# Patient Record
Sex: FEMALE | Race: WHITE | Employment: FULL TIME | URBAN - METROPOLITAN AREA
[De-identification: names, ages, dates, MRNs, and addresses within clinical notes are randomized per-mention and may not be internally consistent; named-entity substitution may affect disease eponyms.]

---

## 2017-01-25 ENCOUNTER — HOSPITAL ENCOUNTER (INPATIENT)
Age: 42
LOS: 8 days | Discharge: HOME | End: 2017-02-04
Attending: OBSTETRICS & GYNECOLOGY | Admitting: OBSTETRICS & GYNECOLOGY
Payer: COMMERCIAL

## 2017-01-25 DIAGNOSIS — Z98.891 H/O CESAREAN SECTION: Primary | ICD-10-CM

## 2017-01-25 DIAGNOSIS — O14.93 PRE-ECLAMPSIA IN THIRD TRIMESTER: ICD-10-CM

## 2017-01-25 LAB
ALBUMIN SERPL BCP-MCNC: 2.3 G/DL (ref 3.5–5)
ALBUMIN/GLOB SERPL: 0.6 {RATIO} (ref 1.2–3.5)
ALP SERPL-CCNC: 146 U/L (ref 50–136)
ALT SERPL-CCNC: 11 U/L (ref 12–65)
ANION GAP BLD CALC-SCNC: 11 MMOL/L (ref 7–16)
AST SERPL W P-5'-P-CCNC: 13 U/L (ref 15–37)
BILIRUB SERPL-MCNC: 0.2 MG/DL (ref 0.2–1.1)
BUN SERPL-MCNC: 6 MG/DL (ref 6–23)
CALCIUM SERPL-MCNC: 8.5 MG/DL (ref 8.3–10.4)
CHLORIDE SERPL-SCNC: 106 MMOL/L (ref 98–107)
CO2 SERPL-SCNC: 23 MMOL/L (ref 21–32)
CREAT SERPL-MCNC: 0.52 MG/DL (ref 0.6–1)
ERYTHROCYTE [DISTWIDTH] IN BLOOD BY AUTOMATED COUNT: 14.5 % (ref 11.9–14.6)
GLOBULIN SER CALC-MCNC: 3.8 G/DL (ref 2.3–3.5)
GLUCOSE SERPL-MCNC: 91 MG/DL (ref 65–100)
HCT VFR BLD AUTO: 34.1 % (ref 35.8–46.3)
HGB BLD-MCNC: 11.1 G/DL (ref 11.7–15.4)
LDH SERPL L TO P-CCNC: 180 U/L (ref 100–190)
MCH RBC QN AUTO: 27.4 PG (ref 26.1–32.9)
MCHC RBC AUTO-ENTMCNC: 32.6 G/DL (ref 31.4–35)
MCV RBC AUTO: 84.2 FL (ref 79.6–97.8)
PLATELET # BLD AUTO: 246 K/UL (ref 150–450)
PMV BLD AUTO: 10 FL (ref 10.8–14.1)
POTASSIUM SERPL-SCNC: 3.9 MMOL/L (ref 3.5–5.1)
PROT SERPL-MCNC: 6.1 G/DL (ref 6.3–8.2)
RBC # BLD AUTO: 4.05 M/UL (ref 4.05–5.25)
SODIUM SERPL-SCNC: 140 MMOL/L (ref 136–145)
URATE SERPL-MCNC: 3.6 MG/DL (ref 2.6–6)
WBC # BLD AUTO: 12 K/UL (ref 4.3–11.1)

## 2017-01-25 PROCEDURE — 59025 FETAL NON-STRESS TEST: CPT

## 2017-01-25 PROCEDURE — 84156 ASSAY OF PROTEIN URINE: CPT | Performed by: OBSTETRICS & GYNECOLOGY

## 2017-01-25 PROCEDURE — 85027 COMPLETE CBC AUTOMATED: CPT | Performed by: OBSTETRICS & GYNECOLOGY

## 2017-01-25 PROCEDURE — 84550 ASSAY OF BLOOD/URIC ACID: CPT | Performed by: OBSTETRICS & GYNECOLOGY

## 2017-01-25 PROCEDURE — 80053 COMPREHEN METABOLIC PANEL: CPT | Performed by: OBSTETRICS & GYNECOLOGY

## 2017-01-25 PROCEDURE — 36415 COLL VENOUS BLD VENIPUNCTURE: CPT | Performed by: OBSTETRICS & GYNECOLOGY

## 2017-01-25 PROCEDURE — 99218 HC RM OBSERVATION: CPT

## 2017-01-25 PROCEDURE — 83615 LACTATE (LD) (LDH) ENZYME: CPT | Performed by: OBSTETRICS & GYNECOLOGY

## 2017-01-25 RX ORDER — OMEPRAZOLE 20 MG/1
20 CAPSULE, DELAYED RELEASE ORAL DAILY
COMMUNITY
End: 2017-02-16 | Stop reason: ALTCHOICE

## 2017-01-25 NOTE — PROGRESS NOTES
01/25/17 1820   Fetal Vital Signs   Mode External   Fetal Heart Rate 120   Fetal Activity Present   Variability 6-25 BPM   Decelerations None   Accelerations Yes   RN Reviewed Strip?  Yes   Non Stress Test Reactive   Uterine Activity   Mode External   Frequency (min) 0

## 2017-01-25 NOTE — IP AVS SNAPSHOT
[address] 
[phone] Patient: Guerrero Sidhu MRN: [id number] :1975 You are allergic to the following Allergen Reactions Aspirin Other (comments) asthma Daypro (Oxaprozin) Rash Flaxseed Rash Ibuprofen Other (comments) asthma Penicillins Other (comments) Patient states MD told her not to take because she has a severe mold allergy Yeast, Dried Unknown (comments) Immunizations Administered for This Admission Name Date Influenza Vaccine (Quad) PF  Deferred () Tdap  Deferred () Recent Documentation Height Weight Breastfeeding? BMI OB Status Smoking Status 1.6 m 110.3 kg Unknown 43.09 kg/m2 Recent pregnancy Never Smoker Emergency Contacts Name Discharge Info Relation Home Work Mobile Zechariah Quick CAREGIVER [3] Mother [14] [phone] About your hospitalization You were admitted on:  2017 You last received care in the:  [address] [facility] You were discharged on:  2017 Unit phone number:  [phone] Why you were hospitalized Your primary diagnosis was:  Pre-Eclampsia In Third Trimester Your diagnoses also included:  [place] (Advanced Maternal Age) Multigravida 35+, H/O  Section,  Delivery Delivered Providers Seen During Your Hospitalizations Provider Role Specialty Primary office phone Aimee Jackson MD Attending Provider Obstetrics & Gynecology [phone] Mohan Putnam DO Attending Provider Obstetrics & Gynecology [phone] Your Primary Care Physician (PCP) Primary Care Physician Office Phone Office Fax Bola Weston [phone] Follow-up Information Follow up With Details Comments Contact Info Corie Negro DO Schedule an appointment as soon as possible for a visit in 2 weeks call office for an appointment [address] [facility] [address] [employer] [place] [address] [phone] Ligia Fernandez MD   [address] [facility] [employer] [place] [address] 
[phone] Current Discharge Medication List  
  
START taking these medications Dose & Instructions Dispensing Information Comments Morning Noon Evening Bedtime  
 oxyCODONE IR 10 mg Tab immediate release tablet Commonly known as:  Stana Creston Your next dose is: Today, Tomorrow Other:  _________ Dose:  10 mg Take 1 Tab by mouth every four (4) hours as needed. Max Daily Amount: 60 mg.  
 Quantity:  40 Tab Refills:  0 CONTINUE these medications which have NOT CHANGED Dose & Instructions Dispensing Information Comments Morning Noon Evening Bedtime  
 albuterol 90 mcg/actuation inhaler Commonly known as:  Sharlene Jessica Your next dose is: Today, Tomorrow Other:  _________ Dose:  2 Puff Take 2 Puffs by inhalation every six (6) hours as needed. Patient instructed to bring hospital DOS Refills:  0  
     
   
   
   
  
 FLONASE 50 mcg/actuation nasal spray Generic drug:  fluticasone Your next dose is: Today, Tomorrow Other:  _________ Dose:  2 Spray 2 Sprays by Both Nostrils route nightly. Refills:  0 PNV-DHA PO Your next dose is: Today, Tomorrow Other:  _________ Take  by mouth. Refills:  0 PriLOSEC 20 mg capsule Generic drug:  omeprazole Your next dose is: Today, Tomorrow Other:  _________ Dose:  20 mg Take 20 mg by mouth daily. Refills:  0 PRILOSEC PO Your next dose is: Today, Tomorrow Other:  _________ Take  by mouth. Refills:  0  
     
   
   
   
  
 ZANTAC 150 mg tablet Generic drug:  raNITIdine Your next dose is: Today, Tomorrow Other:  _________ Dose:  150 mg Take 150 mg by mouth two (2) times a day. Refills:  0 ZyrTEC 10 mg tablet Generic drug:  cetirizine Your next dose is: Today, Tomorrow Other:  _________ Dose:  10 mg Take 10 mg by mouth nightly. Refills:  0 Where to Get Your Medications Information on where to get these meds will be given to you by the nurse or doctor. ! Ask your nurse or doctor about these medications  
  oxyCODONE IR 10 mg Tab immediate release tablet Discharge Instructions Discharge instruction to follow: Activity: Pelvis rest for 6 weeks No heavy lifting over 15 lbs for 2 weeks No driving for 2 weeks No push/pull motion such as sweeping or vacuuming for 2 weeks No tub baths for 6 weeks  section keep incision clean and dry, may shower as normal with soap and water. Inspect incision every day for signs of infection listed below. Continue to use ruth-bottle with every void or bowel movement until comfortable stopping. Change sanitary pad after each urination or bowel movement. Call MD for the following: 
    Fever over 101 F; pain not relieved by medication; foul smelling vaginal discharge or increase in vaginal bleeding. Redness, swelling, or drainage from  incision. Take medication as prescribed. Follow up with MD as order.  Section: What to Expect at [facility] Your Recovery A  section, or , is surgery to deliver your baby through a cut, called an incision, that the doctor makes in your lower belly and uterus. You may have some pain in your lower belly and need pain medicine for 1 to 2 weeks. You can expect some vaginal bleeding for several weeks.  You will probably need about 6 weeks to fully recover. It is important to take it easy while the incision is healing. Avoid heavy lifting, strenuous activities, or exercises that strain the belly muscles while you are recovering. Ask a family member or friend for help with housework, cooking, and shopping. This care sheet gives you a general idea about how long it will take for you to recover. But each person recovers at a different pace. Follow the steps below to get better as quickly as possible. How can you care for yourself at home? Activity · Rest when you feel tired. Getting enough sleep will help you recover. · Try to walk each day. Start by walking a little more than you did the day before. Bit by bit, increase the amount you walk. Walking boosts blood flow and helps prevent pneumonia, constipation, and blood clots. · Avoid strenuous activities, such as bicycle riding, jogging, weightlifting, and aerobic exercise, for 6 weeks or until your doctor says it is okay. · Until your doctor says it is okay, do not lift anything heavier than your baby. · Do not do sit-ups or other exercises that strain the belly muscles for 6 weeks or until your doctor says it is okay. · Hold a pillow over your incision when you cough or take deep breaths. This will support your belly and decrease your pain. · You may shower as usual. Pat the incision dry when you are done. · You will have some vaginal bleeding. Wear sanitary pads. Do not douche or use tampons until your doctor says it is okay. · Ask your doctor when you can drive again. · You will probably need to take at least 6 weeks off work. It depends on the type of work you do and how you feel. · Ask your doctor when it is okay for you to have sex. Diet · You can eat your normal diet. If your stomach is upset, try bland, low-fat foods like plain rice, broiled chicken, toast, and yogurt. · Drink plenty of fluids (unless your doctor tells you not to). · You may notice that your bowel movements are not regular right after your surgery. This is common. Try to avoid constipation and straining with bowel movements. You may want to take a fiber supplement every day. If you have not had a bowel movement after a couple of days, ask your doctor about taking a mild laxative. · If you are breastfeeding, do not drink any alcohol. Medicines · Your doctor will tell you if and when you can restart your medicines. He or she will also give you instructions about taking any new medicines. · If you take blood thinners, such as warfarin (Coumadin), clopidogrel (Plavix), or aspirin, be sure to talk to your doctor. He or she will tell you if and when to start taking those medicines again. Make sure that you understand exactly what your doctor wants you to do. · Take pain medicines exactly as directed. ¨ If the doctor gave you a prescription medicine for pain, take it as prescribed. ¨ If you are not taking a prescription pain medicine, ask your doctor if you can take an over-the-counter medicine. · If you think your pain medicine is making you sick to your stomach: 
¨ Take your medicine after meals (unless your doctor has told you not to). ¨ Ask your doctor for a different pain medicine. · If your doctor prescribed antibiotics, take them as directed. Do not stop taking them just because you feel better. You need to take the full course of antibiotics. Incision care · If you have strips of tape on the incision, leave the tape on for a week or until it falls off. · Wash the area daily with warm, soapy water, and pat it dry. Don't use hydrogen peroxide or alcohol, which can slow healing. You may cover the area with a gauze bandage if it weeps or rubs against clothing. Change the bandage every day. · Keep the area clean and dry. Other instructions · If you breastfeed your baby, you may be more comfortable while you are healing if you place the baby so that he or she is not resting on your belly. Try tucking your baby under your arm, with his or her body along the side you will be feeding on. Support your baby's upper body with your arm. With that hand you can control your baby's head to bring his or her mouth to your breast. This is sometimes called the football hold. Follow-up care is a key part of your treatment and safety. Be sure to make and go to all appointments, and call your doctor if you are having problems. It's also a good idea to know your test results and keep a list of the medicines you take. When should you call for help? Call 911 anytime you think you may need emergency care. For example, call if: 
· You passed out (lost consciousness). · You have symptoms of a blood clot in your lung (called a pulmonary embolism). These may include: 
¨ Sudden chest pain. ¨ Trouble breathing. ¨ Coughing up blood. · You have thoughts of harming yourself, your baby, or another person. Call your doctor now or seek immediate medical care if: 
· You have severe vaginal bleeding. This means that you are soaking through a pad every hour for 2 or more hours. · You are dizzy or lightheaded, or you feel like you may faint. · You have new or more belly pain. · You have loose stitches, or your incision comes open. · You have symptoms of infection, such as: 
¨ Increased pain, swelling, warmth, or redness. ¨ Red streaks leading from the incision. ¨ Pus draining from the incision. ¨ A fever. · You have symptoms of a blood clot in your leg (called a deep vein thrombosis), such as: 
¨ Pain in your calf, back of the knee, thigh, or groin. ¨ Redness and swelling in your leg or groin. Watch closely for changes in your health, and be sure to contact your doctor if: 
· You feel sad, anxious, or hopeless for more than a few days. · You do not get better as expected. Where can you learn more? Go to http://yan-rita.info/. Enter M806 in the search box to learn more about \" Section: What to Expect at Home. \" Current as of: May 30, 2016 Content Version: 11.1 © 8547-6856 [employer], [employer]orporated. Care instructions adapted under license by [employer] (which disclaims liability or warranty for this information). If you have questions about a medical condition or this instruction, always ask your healthcare professional. [address] any warranty or liability for your use of this information. Discharge Orders None Taomee Announcement We are excited to announce that we are making your provider's discharge notes available to you in Taomee. You will see these notes when they are completed and signed by the physician that discharged you from your recent hospital stay. If you have any questions or concerns about any information you see in Taomee, please call the Health Information Department where you were seen or reach out to your Primary Care Provider for more information about your plan of care. Introducing \[facility] [place] [facility]\"" & HEALTH SERVICES! Cr Morton introduces Taomee patient portal. Now you can access parts of your medical record, email your doctor's office, and request medication refills online. 1. In your internet browser, go to [email or web address] 2. Click on the First Time User? Click Here link in the Sign In box. You will see the New Member Sign Up page. 3. Enter your Taomee Access Code exactly as it appears below. You will not need to use this code after youve completed the sign-up process. If you do not sign up before the expiration date, you must request a new code. · Taomee Access Code: [email or web address] Expires: 2017  9:38 AM 
 
4. Enter the last four digits of your Social Security Number (xxxx) and Date of Birth (mm/dd/yyyy) as indicated and click Submit.  You will be taken to the next sign-up page. 5. Create a SiteMinder ID. This will be your SiteMinder login ID and cannot be changed, so think of one that is secure and easy to remember. 6. Create a SiteMinder password. You can change your password at any time. 7. Enter your Password Reset Question and Answer. This can be used at a later time if you forget your password. 8. Enter your e-mail address. You will receive e-mail notification when new information is available in [address] 9. Click Sign Up. You can now view and download portions of your medical record. 10. Click the Download Summary menu link to download a portable copy of your medical information. If you have questions, please visit the Frequently Asked Questions section of the SiteMinder website. Remember, SiteMinder is NOT to be used for urgent needs. For medical emergencies, dial 911. Now available from your iPhone and Android! General Information Please provide this summary of care documentation to your next provider. Patient Signature:  ____________________________________________________________ Date:  ____________________________________________________________  
  
Tramaine Whitley Provider Signature:  ____________________________________________________________ Date:  ____________________________________________________________

## 2017-01-25 NOTE — PROGRESS NOTES
Pt arrived. Placed in 46 for direct admission from [facility] for preeclampsia work up. Orders received. Dr. Dominguez Quinones also aware of consult.

## 2017-01-25 NOTE — IP AVS SNAPSHOT
Current Discharge Medication List  
  
Take these medications at their scheduled times Dose & Instructions Dispensing Information Comments Morning Noon Evening Bedtime FLONASE 50 mcg/actuation nasal spray Generic drug:  fluticasone Your next dose is: Today, Tomorrow Other:  ____________ Dose:  2 Spray 2 Sprays by Both Nostrils route nightly. Refills:  0 PriLOSEC 20 mg capsule Generic drug:  omeprazole Your next dose is: Today, Tomorrow Other:  ____________ Dose:  20 mg Take 20 mg by mouth daily. Refills:  0  
     
   
   
   
  
 ZANTAC 150 mg tablet Generic drug:  raNITIdine Your next dose is: Today, Tomorrow Other:  ____________ Dose:  150 mg Take 150 mg by mouth two (2) times a day. Refills:  0 ZyrTEC 10 mg tablet Generic drug:  cetirizine Your next dose is: Today, Tomorrow Other:  ____________ Dose:  10 mg Take 10 mg by mouth nightly. Refills:  0 Take these medications as needed Dose & Instructions Dispensing Information Comments Morning Noon Evening Bedtime  
 albuterol 90 mcg/actuation inhaler Commonly known as:  Verlon Abler Your next dose is: Today, Tomorrow Other:  ____________ Dose:  2 Puff Take 2 Puffs by inhalation every six (6) hours as needed. Patient instructed to bring hospital DOS Refills:  0  
     
   
   
   
  
 oxyCODONE IR 10 mg Tab immediate release tablet Commonly known as:  Argenis Santo Your next dose is: Today, Tomorrow Other:  ____________ Dose:  10 mg Take 1 Tab by mouth every four (4) hours as needed. Max Daily Amount: 60 mg.  
 Quantity:  40 Tab Refills:  0 Take these medications as directed Dose & Instructions Dispensing Information Comments Morning Noon Evening Bedtime PNV-DHA PO Your next dose is: Today, Tomorrow Other:  ____________ Take  by mouth. Refills:  0 PRILOSEC PO Your next dose is: Today, Tomorrow Other:  ____________ Take  by mouth. Refills:  0 Where to Get Your Medications Information about where to get these medications is not yet available ! Ask your nurse or doctor about these medications  
  oxyCODONE IR 10 mg Tab immediate release tablet

## 2017-01-26 PROBLEM — O13.3 PREGNANCY-INDUCED HYPERTENSION IN THIRD TRIMESTER: Status: ACTIVE | Noted: 2017-01-26

## 2017-01-26 PROBLEM — I10 HYPERTENSION: Status: ACTIVE | Noted: 2017-01-26

## 2017-01-26 LAB
COLLECT DURATION TIME UR: 24 HR
PROT 24H UR-MRATE: 578 MG/24HR
PROT UR-MCNC: 11 MG/DL
SPECIMEN VOL ?TM UR: 5250 ML

## 2017-01-26 PROCEDURE — 99218 HC RM OBSERVATION: CPT

## 2017-01-26 PROCEDURE — 76805 OB US >/= 14 WKS SNGL FETUS: CPT | Performed by: OBSTETRICS & GYNECOLOGY

## 2017-01-26 PROCEDURE — 76819 FETAL BIOPHYS PROFIL W/O NST: CPT | Performed by: OBSTETRICS & GYNECOLOGY

## 2017-01-26 PROCEDURE — 59025 FETAL NON-STRESS TEST: CPT

## 2017-01-26 PROCEDURE — 76820 UMBILICAL ARTERY ECHO: CPT | Performed by: OBSTETRICS & GYNECOLOGY

## 2017-01-26 PROCEDURE — 74011250636 HC RX REV CODE- 250/636: Performed by: OBSTETRICS & GYNECOLOGY

## 2017-01-26 PROCEDURE — 74011250637 HC RX REV CODE- 250/637: Performed by: OBSTETRICS & GYNECOLOGY

## 2017-01-26 RX ORDER — BETAMETHASONE SODIUM PHOSPHATE AND BETAMETHASONE ACETATE 3; 3 MG/ML; MG/ML
12 INJECTION, SUSPENSION INTRA-ARTICULAR; INTRALESIONAL; INTRAMUSCULAR; SOFT TISSUE EVERY 24 HOURS
Status: DISCONTINUED | OUTPATIENT
Start: 2017-01-26 | End: 2017-01-28

## 2017-01-26 RX ORDER — ACETAMINOPHEN 500 MG
1000 TABLET ORAL
Status: DISCONTINUED | OUTPATIENT
Start: 2017-01-26 | End: 2017-02-01

## 2017-01-26 RX ADMIN — BETAMETHASONE SODIUM PHOSPHATE AND BETAMETHASONE ACETATE 12 MG: 3; 3 INJECTION, SUSPENSION INTRA-ARTICULAR; INTRALESIONAL; INTRAMUSCULAR at 21:56

## 2017-01-26 RX ADMIN — ACETAMINOPHEN 1000 MG: 500 TABLET, COATED ORAL at 20:29

## 2017-01-26 RX ADMIN — ACETAMINOPHEN 1000 MG: 500 TABLET, COATED ORAL at 14:12

## 2017-01-26 NOTE — PROGRESS NOTES
Pt irritated , reports she received a phone call from home health agency asking questions regarding her [facility] that they should be getting from her Doctors office. Pt states they are jumping the gun with thinking she is going home when we don't have the results to her lab work yet. Reassurance given.

## 2017-01-26 NOTE — CONSULTS
Maternal Fetal Medicine Consult Note      Requesting DELORES Gudino    Chief Complaint:  Pregnancy and elevated BP at [facility] office. History of Present Eb Patel is a 42 y.o.   with an estimated gestational age of 43w3d with Estimated Date of Delivery: 17. Pregnancy has been complicated by advanced maternal age. Patient had low risk NIPT and normal anatomy in primary ob office. FOllow up growth at ~34 weeks with appropriate overall EFW but AC at 97%. 1hr . TWG thus far 56#. Now with increased BP over baseline (166/94 in ob office) admitted for preeclampsia evaluation. New peripheral edema the past 2 weeks. Since admission, BP normal to mild range on bedrest. Headache has developed during course of day today. Patient denies HA, RUQ pain, vision changes, or other concerns. Fetus has been active without any recent decrease in movement activity.  No regular contractions, LOF, VB.     OB History    Para Term  AB SAB TAB Ectopic Multiple Living   2 1 1 0 0 0 0 0 0 1      # Outcome Date GA Lbr Ankit/2nd Weight Sex Delivery Anes PTL Lv   2 Current            1 Term 2006    F CS-LTranv   Y         Past Surgical History   Procedure Laterality Date    Hx heent       sinus    Hx  section         Past Medical History   Diagnosis Date    Asthma      uses inhaler as needed    Endometrial polyp     H/O seasonal allergies      seasonal allergies    Pregnancy 2016       Family History   Problem Relation Age of Onset    Heart Disease Mother     Heart Disease Father     Cancer Paternal Grandmother      colon    Malignant Hyperthermia Neg Hx     Pseudocholinesterase Deficiency Neg Hx     Delayed Awakening Neg Hx     Post-op Nausea/Vomiting Neg Hx     Emergence Delirium Neg Hx     Post-op Cognitive Dysfunction Neg Hx     Other Neg Hx     Breast Cancer Neg Hx        Allergies   Allergen Reactions    Aspirin Other (comments)     asthma  Daypro [Oxaprozin] Rash    Flaxseed Rash    Ibuprofen Other (comments)     asthma    Penicillins Other (comments)     Patient states MD told her not to take because she has a severe mold allergy    Yeast, Dried Unknown (comments)       No current facility-administered medications for this encounter. Social History     Social History    Marital status: SINGLE     Spouse name: N/A    Number of children: N/A    Years of education: N/A     Occupational History    Not on file. Social History Main Topics    Smoking status: Never Smoker    Smokeless tobacco: Not on file    Alcohol use No      Comment: occasional before prenancy    Drug use: No    Sexual activity: No     Other Topics Concern    Not on file     Social History Narrative       Review of Systems  A comprehensive review of systems was negative except for that written in the HPI. Vitals:    Patient Vitals for the past 24 hrs:   BP   17 0803 134/67   17 0241 137/88   17 2256 130/75   17 2102 154/80   17 1802 137/88     Temp (24hrs), Av.4 °F (36.9 °C), Min:98.3 °F (36.8 °C), Max:98.5 °F (36.9 °C)      I&O:                1901 -  0700  In: 1166 [P.O.:4010]  Out: 5517 [Urine:5250]    Exam:  Patient without distress.                Abdomen: soft, non-tender               Fundus: soft and non tender               Fundal Height: 40 cm               Right Upper Quadrant: non-tender               Lower Extremity Edema: 2+               Patellar Reflexes: 1+ bilaterally               Clonus: absent    Cervical Exam:                                    Uterine Activity: Frequency (min):  (occasional) Intensity: Mild                                 Membranes: Membrane Status: Intact                              Fetal Heart Rate: Mode: ExternalFetal Heart Rate: 135          Labs:   CBC:    Recent Labs      17   1824  17   0835 16   WBC  12.0*  10.4   --    HGB  11.1*  11.5   --    HCT 34.1*  35.8   --    PLT  246  242   --    HGBEXT   --    --   13.9   HCTEXT   --    --   42.5   PLTEXT   --    --   307       CMP:   Recent Labs      174  17   0835   NA  140  141   K  3.9  4.5   CL  106  105   CO2  23  20   AGAP  11   --    GLU  91  123*   BUN  6  6   CREA  0.52*  0.56*   GFRAA  >60  133   GFRNA  >60  115   CA  8.5  9.4   ALB  2.3*  3.1*   TP  6.1*  5.3*   GLOB  3.8*   --    AGRAT  0.6*  1.4   SGOT  13*  16   ALT  11*  8       Recent Labs      174  17   0837   URICA  3.6  5.0   LDH  180  180       Recent Glucose Results: Recent Glucose Results:   Recent Labs      174  17   0835   GLU  91  123*       Prenatal Labs:    Lab Results   Component Value Date/Time    Rubella, External 4.77 2016    HBsAg, External NEG 2016    HIV, External NR 2016    RPR, External NR 2016       Imaging: Full report to follow. Polyhydramnios (25cm) with Accelerated fetal growth. Normal umbilical artery Doppler studies      Assessment and Plan:    Patient Active Problem List    Diagnosis    Pregnancy-induced hypertension in third trimester    AMA (advanced maternal age) multigravida 35+     43 yo low risk NIPT  Growth scans      H/O  section     Plan repeat         Hypertensive Disorder of Pregnancy: gestational hypertension versus preeclampsia without severe features. Diagnosis  Diagnosis of preeclampsia depends on development of elevated BP (SBP>140, DBP >90) beyond 20 weeks gestation or worsening of preexisting blood pressure. Severe blood pressures, SBP>160 or DBP>110 or higher on two occasions 4 hours apart, on bedrest (unless antihypertensive therapy is initiated before this time) are a criteria of severe disease. Proteinuria (>300mg/24hr or protein:creatine ratio >0.3) may or may not be present. The presence or absence of proteinuria is not predictive of maternal or fetal outcome.    If proteinuria is absent, diagnosis requires at least one of the following features of severe disease:  ·  thrombocytopenia (platelet count <256S)  · impaired liver function (transaminaases increased greater than 2x normal)  · new renal insufficiency (Cr >1.1, or doubling in absence of other renal disease)  · pulmonary edema  · new onset of cerebral/visual disturbances. Anti-hypertensive Therapy  · No therapy is recommended for new onset HTN for blood pressures consistently in mild range (SBP<160, DBP<110). · Use of anti-hypertensive therapy is recommended if SBP >160, DBP >110. Antepartum monitoring  · Daily assessment of maternal symptoms and fetal movement  · At least twice weekly measures of BP  · Weekly assessment of platelet count and LFTs. · If gestational HTN, weekly NST/BPP; if preeclampsia without severe features, twice weekly NST/BPP. · Compliant patients without features of severe disease may qualify for outpatient management on a case by case basis. If would like home BP monitoring, please consult social work to aid in setting this up. GESTATIONAL HTN-   Patient with proteinuria <300 mgs will not meet criteria for preeclampsia. With reassuring fetal status and appropriate growth, recommendations for timing of delivery is waiting until 37-39 weeks. PREECLAMPSIA without SEVERE FEATURES- Attempt to manage expectantly until 37 0/7 weeks, unless features of severe preeclampsia occurs or fetal status becomes non reassuring  · In those with mild range BP (<160 SBP, <110 DBP) and without maternal symptoms, magnesium sulfate is not necessary. · Consider late   steroids if <37 weeks. PREECLAMPSIA with SEVERE FEATURES, including HELLP syndrome:  · If becomes severe, magnesium sulfate is recommended. 4-6 gram bolus, then maintenance of 2 grams per hour. · Get serum Mg level 4 hours after bolus, then every 6 hours.     Mode of delivery based on obstetric indications, rather than diagnosis of hypertensive disorder in pregnancy. POSTPARTUM CARE  · NSAID use should be avoided postpartum in women with hypertension persisting for more than 1 days after delivery. · BP monitoring as an inpatient for at least 72 hours postpartum, and again 7-10 days after delivery. · If persistent HTN postpartum (SBP>150, DBP>100), recommend initiation of antihypertensive therapy. · Fluid shifts should be closely monitored in the postpartum setting, with strict I&Os every shift and daily weights. If diuresis has not occurred within 48 hours of delivery, patient is at elevated risk of pulmonary edema. Recommendations based on ACOG Committee Opinion #560, April 2013 and \"Hypertension in [place]" developed by the [place] on HTN in Pregnancy for ACOG, November 2013. Probable glucose intolerance with elevated GCT, polyhydramnios, accelerated fetal growth. Recommend checking glucose x 24hr. Plan:   · Admit for observation  · Strict I&Os  · Daily weights  · Repeat preeclampsia labs (CBC, CMP, Uric Acid, LDH) if worsening  · Assess for proteinuria- timed urine complete this pm  · Daily monitoring with NSTs while inpatient  · Ultrasonographic evaluation of fetal growth, fluid, and well-being today - full report pending. · Deliver for worsening maternal or fetal status      Signed By:  Rigo Martines MD     January 26, 2017             Time:110    Minutes spent on floor,with greater than 50% of the time examining patient, explaining plan and coordinating care with nurse and requesting primary physician.

## 2017-01-26 NOTE — H&P
Ante Partum High Risk Pregnancy Note    Patient admitted for elevated blood pressure in physician's office  seen and examined by Dr Lisy Gudino yesterday in office was sent here for pre-ecl workup per [facility] recomendations states she does have edema. No headache not vision changes, good fetal movement    LOS:  1  Vitals: Temp (24hrs), Av.4 °F (36.9 °C), Min:98.3 °F (36.8 °C), Max:98.5 °F (36.9 °C)   Patient Vitals for the past 24 hrs:   BP   17 0803 134/67   17 0241 137/88   17 2256 130/75   17 2102 154/80   17 1802 137/88       I&O:                  190 -  0700  In: 3042 [P.O.:4010]  Out: 6441 [Urine:5250]    Exam:  Patient without distress.                Abdomen: soft, non-tender               Fundus: soft and non tender                          Right Upper Quadrant: non-tender               Perineum: No sign of blood or amniotic fluid               Lower Extremities: 2+               Patellar Reflexes: 1+ bilaterally               Clonus: absent                            NST:  reactive           Lab/Data Review:  CMP:   Lab Results   Component Value Date/Time     2017 06:24 PM    K 3.9 2017 06:24 PM     2017 06:24 PM    CO2 23 2017 06:24 PM    AGAP 11 2017 06:24 PM    GLU 91 2017 06:24 PM    BUN 6 2017 06:24 PM    CREA 0.52 (L) 2017 06:24 PM    GFRAA >60 2017 06:24 PM    GFRNA >60 2017 06:24 PM    CA 8.5 2017 06:24 PM    ALB 2.3 (L) 2017 06:24 PM    TP 6.1 (L) 2017 06:24 PM    GLOB 3.8 (H) 2017 06:24 PM    AGRAT 0.6 (L) 2017 06:24 PM    SGOT 13 (L) 2017 06:24 PM    ALT 11 (L) 2017 06:24 PM     CBC:   Lab Results   Component Value Date/Time    WBC 12.0 (H) 2017 06:24 PM    HGB 11.1 (L) 2017 06:24 PM    HCT 34.1 (L) 2017 06:24 PM     2017 06:24 PM       Assessment and Plan:      Principal Problem:    Hypertension (1/26/2017)          hypertension in 3rd trimester, some elevated BP's noted at 19 week visit 134/74. ? CHTN worsening vs preecp- check 24 hour urine.  MFM to do US today, all labs normal

## 2017-01-26 NOTE — PROGRESS NOTES
1000 - Referral faxed to [facility] (P: [phone]) to arrange home BP monitoring. 1230 - Phone call placed to [facility].  confirmed that [employer] does cover home BP monitoring. RN with [facility] has not yet contacted patient for intake, but will do so shortly. 1440 - Phone call received from [facility] representative who states that she just spoke with patient who became extremely upset over the phone. Patient informed [facility] that she was unaware of who they were and why they were calling and asking questions. [facility] then ended the conversation with patient and has asked that this  follow-up with patient. RN was notified of this information. At this time, [facility] will await feedback from hospital before contacting patient again to arrange services.     Chel De La Cruz, [address] [facility]

## 2017-01-26 NOTE — PROGRESS NOTES
Called to room pt c/o having a dull headache 2/10 on pain scale. Dr Gertrude Kendrick notified orders received.

## 2017-01-27 PROBLEM — O16.9 HTN COMPLICATING PERIPREGNANCY, ANTEPARTUM: Status: ACTIVE | Noted: 2017-01-27

## 2017-01-27 PROCEDURE — 65270000029 HC RM PRIVATE

## 2017-01-27 PROCEDURE — 74011250637 HC RX REV CODE- 250/637: Performed by: OBSTETRICS & GYNECOLOGY

## 2017-01-27 PROCEDURE — 74011250636 HC RX REV CODE- 250/636: Performed by: OBSTETRICS & GYNECOLOGY

## 2017-01-27 PROCEDURE — 99218 HC RM OBSERVATION: CPT

## 2017-01-27 PROCEDURE — 59025 FETAL NON-STRESS TEST: CPT

## 2017-01-27 PROCEDURE — 99231 SBSQ HOSP IP/OBS SF/LOW 25: CPT | Performed by: OBSTETRICS & GYNECOLOGY

## 2017-01-27 RX ADMIN — BETAMETHASONE SODIUM PHOSPHATE AND BETAMETHASONE ACETATE 12 MG: 3; 3 INJECTION, SUSPENSION INTRA-ARTICULAR; INTRALESIONAL; INTRAMUSCULAR at 22:00

## 2017-01-27 RX ADMIN — ACETAMINOPHEN 1000 MG: 500 TABLET, COATED ORAL at 19:49

## 2017-01-27 NOTE — PROGRESS NOTES
01/27/17 1612   Maternal Vital Signs   Temp 97.9 °F (36.6 °C)   Temp Source Oral   Pulse (Heart Rate) (!) 118   Resp Rate 20   Level of Consciousness Alert   /85   MAP (Calculated) 107   BP 1 Method Automatic   BP 1 Location Right arm   BP Patient Position Sitting

## 2017-01-27 NOTE — PROGRESS NOTES
Dr. Nj Sheppard called to nurses' station. MD reviewed 24hr urine results with Dr. Candida Goldstein. MD orders received for celestone series.

## 2017-01-27 NOTE — PROGRESS NOTES
Tylenol 1000mg given po for c/o headache that pt rates as 3/10  No reports of blurred vision or flashes of light. Pt encouraged to rest on side and call for unrelieved pain or changes.

## 2017-01-27 NOTE — PROGRESS NOTES
Ante Partum High Risk Pregnancy Note  Patient: Silvia Victor  MRN: [id number]    Patient admitted for elevated blood pressure in physician's office  states she does not  have  headache , abdominal pain  , contractions, right upper quadrant pain  , vaginal bleeding , swelling and vaginal leaking of fluid . LOS:  2  Vitals: Temp (24hrs), Av.3 °F (36.8 °C), Min:98.1 °F (36.7 °C), Max:98.4 °F (36.9 °C)   Patient Vitals for the past 24 hrs:   BP   17 1012 147/68   17 1010 173/90   17 0804 (!) 158/99   17 2203 136/90   17 1934 150/87   17 1932 150/87   17 1755 141/83   17 1507 135/57   17 1344 172/89       I&O:                  190 -  0700  In: 6675 [P.O.:4010]  Out: 0498 [Urine:5250]    Exam:  Patient without distress. Abdomen: soft, non-tender               Fundus: soft and non tender               Fundal Height: 37 cm               Right Upper Quadrant: non-tender               Perineum: No sign of blood or amniotic fluid               Lower Extremities: 2 + swelling               Patellar Reflexes: 1+ bilaterally               Clonus: absent               Fetal Monitoring:  No decels   Uterine Activity: None                            NST:  reactive           Labs: No results found for this or any previous visit (from the past 24 hour(s)). Assessment and Plan:      Principal Problem:    Pregnancy-induced hypertension in third trimester (2017)    Active Problems:    AMA (advanced maternal age) multigravida 35+ (2016)      Overview: 43 yo low risk NIPT      Growth scans      H/O  section (2016)      Overview: Plan repeat          Pregnancy-Induced Hypertension:  Continue present management  Antepartum testing  Bed rest  Consulted Dr. Yuko Aguirre to cont in hospital until planned repeat Csection at 37 weeks, deliver if condition deteriorates. Pt to receive second steroid dose today.

## 2017-01-27 NOTE — PROGRESS NOTES
01/27/17 1040   Fetal Vital Signs   Mode External   Fetal Heart Rate 140   Fetal Activity Present   Variability 6-25 BPM   Decelerations None   Accelerations Yes   RN Reviewed Strip?  Yes   Non Stress Test Reactive   Uterine Activity   Mode External   Frequency (min) 0

## 2017-01-27 NOTE — PROGRESS NOTES
Shift assessment complete, see flowsheet for complete assessment. Abdomen palpated soft and non-tender. +FM. Pt denies HA, visual disturbances, RUQ pain, nausea, vaginal bleeding, gush of fluid, or UCs. Pt instructed to notify RN if any of the above occur or any changes. Pt verbalizes understanding and agreement. Denies any questions, needs, or concerns at this time. Pt resting comfortably in bed. Ice pitcher refilled.

## 2017-01-27 NOTE — PROGRESS NOTES
MFM Antepartum Progress Note    Patient admitted for elevated BP. She states she has had intermittent headaches, none currently. No abdominal pain, vision changes. No CP/SOA. No regular contractions/LOF/VB. Good FM.      Vitals:  Patient Vitals for the past 24 hrs:   BP   17 1012 147/68   17 1010 173/90   17 0804 (!) 158/99   17 2203 136/90   17 1934 150/87   17 1932 150/87   17 1755 141/83   17 1507 135/57   17 1344 172/89     Temp (24hrs), Av.3 °F (36.8 °C), Min:98.1 °F (36.7 °C), Max:98.4 °F (36.9 °C)      I&O:                1901 -  0700  In: 4010 [P.O.:4010]  Out: 1431 [Urine:5250]                                Uterine Activity: Frequency (min): 0 Intensity: Mild                                 Membranes: Membrane Status: Intact                              Fetal Heart Rate: Mode: ExternalFetal Heart Rate: 140          Labs:   CBC:  Recent Labs      17   0835 16   WBC  12.0*  10.4   --    HGB  11.1*  11.5   --    HCT  34.1*  35.8   --    PLT  246  242   --    HGBEXT   --    --   13.9   HCTEXT   --    --   42.5   PLTEXT   --    --   307       CMP: Recent Labs      17   0835   NA  140  141   K  3.9  4.5   CL  106  105   CO2  23  20   AGAP  11   --    GLU  91  123*   BUN  6  6   CREA  0.52*  0.56*   GFRAA  >60  133   GFRNA  >60  115   CA  8.5  9.4   ALB  2.3*  3.1*   TP  6.1*  5.3*   GLOB  3.8*   --    AGRAT  0.6*  1.4   SGOT  13*  16   ALT  11*  8       Recent Labs      17   1745  17   0837   URICA  3.6   --   5.0   LDH  180   --   180   PUQ   --   578   --        Recent Glucose Results: Recent Glucose Results: Recent Labs      17   1824  17   0835   GLU  91  123*       Prenatal Labs:  Lab Results   Component Value Date/Time    Rubella, External 4.77 2016    HBsAg, External NEG 2016    HIV, External NR 2016    RPR, External NR 2016         Assessment and Plan:    Patient Active Problem List    Diagnosis    Pregnancy-induced hypertension in third trimester    AMA (advanced maternal age) multigravida 35+     43 yo low risk NIPT  Growth scans      H/O  section     Plan repeat         Hypertensive Disorder of Pregnancy: preeclampsia without severe features    Patient stable. -Preeclampsia without severe features- Attempt to manage expectantly until 37 0/7 weeks, unless severe preeclampsia occurs or fetal status becomes non reassuring    Timing of delivery recommendations are based on ACOG Committee Opinion #560, 2013 and \"Hypertension in [place]" task force report by ACOG, 2013. Complete steroids  Deliver for severe disease. Time:15    Minutes spent on floor,with greater than 50% of the time examining patient, explaining plan and coordinating care with nurse and requesting primary physician.

## 2017-01-27 NOTE — PROGRESS NOTES
01/27/17 1010 01/27/17 1012   Maternal Vital Signs   Pulse (Heart Rate) (!) 118 (!) 111   Resp Rate 20 20   Level of Consciousness Alert Alert   /90 147/68   MAP (Calculated) 118 94   BP 1 Method Automatic Automatic   BP 1 Location Right arm Right arm   BP Patient Position Sitting Lying left side

## 2017-01-27 NOTE — PROGRESS NOTES
Phone call to [employer] @ [phone] to inform that patient is no longer in need of home BP monitoring.     Ariel Reardon, [address] [facility]

## 2017-01-27 NOTE — PROGRESS NOTES
Assessment complete at this time per flow sheet. Vital signs stable. Patient denies pain, vaginal bleeding, leaking of fluid, epigastric pain, visual disturbance and contractions. Reports no headache at this time. See flowsheet for complete assessment. Patient states positive fetal movement. +DTR's, absent clonus. Encouraged to call as needed.

## 2017-01-28 PROCEDURE — 65270000029 HC RM PRIVATE

## 2017-01-28 PROCEDURE — 59025 FETAL NON-STRESS TEST: CPT

## 2017-01-28 NOTE — PROGRESS NOTES
Ante Partum High Risk Pregnancy Note  Patient: Catrachito Stallings  MRN: [id number]    Patient admitted for elevated blood pressure in physician's office  states she does not  have  headache , abdominal pain  , contractions, right upper quadrant pain  , vaginal bleeding , swelling and vaginal leaking of fluid . LOS:  3  Vitals: Temp (24hrs), Av.9 °F (36.6 °C), Min:97.6 °F (36.4 °C), Max:98.1 °F (36.7 °C)   Patient Vitals for the past 24 hrs:   BP   17 1225 139/81   17 0917 156/89   17 0901 153/80   17 0850 151/90   17 2201 128/67   17 1922 144/85   17 1613 151/85   17 1612 151/85       I&O:                      Exam:  Patient without distress. Abdomen: soft, non-tender               Fundus: soft and non tender               Fundal Height: 37 cm               Right Upper Quadrant: tender               Perineum: No sign of blood or amniotic fluid               Lower Extremities: No               Patellar Reflexes: 1+ bilaterally               Clonus: absent               Fetal Monitoring:  No decels   Uterine Activity: None                        NST:  reactive           Labs: No results found for this or any previous visit (from the past 24 hour(s)). Assessment and Plan:      Principal Problem:    Pregnancy-induced hypertension in third trimester (2017)    Active Problems:    AMA (advanced maternal age) multigravida 35+ (2016)      Overview: 43 yo low risk NIPT      Growth scans      H/O  section (2016)      Overview: Plan repeat      HTN complicating peripregnancy, antepartum (2017)          Preeclampsia:  moderate  Complete 48 hour course of steroids to promote fetal lung maturity. Daily Fetal monitoring with Non-stress tests  Deliver for Hemolytic Anemia-Elevated Liver Enzymes- Low Platelet Count syndrome, fetal nonreasurrance or worsening maternal condition.   Pt with Hx of C section, will remain in hospital until 40 weeks and have repeat then, deliver sooner if condition deteriorates.

## 2017-01-29 PROCEDURE — 74011250637 HC RX REV CODE- 250/637: Performed by: OBSTETRICS & GYNECOLOGY

## 2017-01-29 PROCEDURE — 65270000029 HC RM PRIVATE

## 2017-01-29 PROCEDURE — 59025 FETAL NON-STRESS TEST: CPT

## 2017-01-29 RX ORDER — FAMOTIDINE 20 MG/1
20 TABLET, FILM COATED ORAL
Status: DISCONTINUED | OUTPATIENT
Start: 2017-01-29 | End: 2017-02-01

## 2017-01-29 RX ORDER — LOPERAMIDE HYDROCHLORIDE 2 MG/1
4 CAPSULE ORAL AS NEEDED
Status: DISCONTINUED | OUTPATIENT
Start: 2017-01-29 | End: 2017-02-01

## 2017-01-29 RX ORDER — ONDANSETRON 8 MG/1
8 TABLET, ORALLY DISINTEGRATING ORAL
Status: DISCONTINUED | OUTPATIENT
Start: 2017-01-29 | End: 2017-02-01

## 2017-01-29 RX ADMIN — ONDANSETRON 8 MG: 8 TABLET, ORALLY DISINTEGRATING ORAL at 02:33

## 2017-01-29 RX ADMIN — ACETAMINOPHEN 1000 MG: 500 TABLET, COATED ORAL at 23:22

## 2017-01-29 RX ADMIN — FAMOTIDINE 20 MG: 20 TABLET ORAL at 00:49

## 2017-01-29 NOTE — PROGRESS NOTES
Ante Partum High Risk Pregnancy Note  Patient: Zari James  MRN: [id number]    Patient admitted for elevated blood pressure in physician's office  states she does not  have  headache , abdominal pain  , contractions, right upper quadrant pain  , vaginal bleeding , swelling and vaginal leaking of fluid . LOS:    Vitals: Temp (24hrs), Av.8 °F (37.1 °C), Min:98.8 °F (37.1 °C), Max:98.8 °F (37.1 °C)   Patient Vitals for the past 24 hrs:   BP   17 0858 150/85   17 0413 143/80   17 0004 144/81   17 0003 171/87   17 2015 147/85   17 1225 139/81       I&O:                      Exam:  Patient without distress. Abdomen: soft, non-tender               Fundus: soft and non tender               Fundal Height: 37 cm               Right Upper Quadrant: non-tender               Perineum: No sign of blood or amniotic fluid               Lower Extremities: No               Patellar Reflexes: 1+ bilaterally               Clonus: absent               Fetal Monitoring:  No decels   Uterine Activity: None                       NST:  reactive           Labs: No results found for this or any previous visit (from the past 24 hour(s)). Assessment and Plan:      Principal Problem:    Pregnancy-induced hypertension in third trimester (2017)    Active Problems:    AMA (advanced maternal age) multigravida 35+ (2016)      Overview: 43 yo low risk NIPT      Growth scans      H/O  section (2016)      Overview: Plan repeat      HTN complicating peripregnancy, antepartum (2017)          Preeclampsia:  moderate  Complete 48 hour course of steroids to promote fetal lung maturity. Daily Fetal monitoring with Non-stress tests  Deliver for Hemolytic Anemia-Elevated Liver Enzymes- Low Platelet Count syndrome, fetal nonreasurrance or worsening maternal condition.     Plan to cont with hospitalization until delivery by repeat Csection on Wednesday, or sooner if indicated.

## 2017-01-30 ENCOUNTER — APPOINTMENT (OUTPATIENT)
Dept: ULTRASOUND IMAGING | Age: 42
End: 2017-01-30
Attending: OBSTETRICS & GYNECOLOGY
Payer: COMMERCIAL

## 2017-01-30 PROBLEM — O14.93 PRE-ECLAMPSIA IN THIRD TRIMESTER: Status: ACTIVE | Noted: 2017-01-26

## 2017-01-30 PROCEDURE — 59025 FETAL NON-STRESS TEST: CPT

## 2017-01-30 PROCEDURE — 76819 FETAL BIOPHYS PROFIL W/O NST: CPT | Performed by: OBSTETRICS & GYNECOLOGY

## 2017-01-30 PROCEDURE — 76818 FETAL BIOPHYS PROFILE W/NST: CPT

## 2017-01-30 PROCEDURE — 65270000029 HC RM PRIVATE

## 2017-01-30 NOTE — PROGRESS NOTES
Dr. Dodie Garcia at nurses desk, strip reviewed by Md.  Orders received to do BPP tomorrow if nurse has trouble getting reactive strip in AM.

## 2017-01-30 NOTE — PROGRESS NOTES
Pt resting quietly at this time. Ice water brought to bedside per patient request.   No other needs voiced at this time. VS stable. Will continue to monitor.

## 2017-01-30 NOTE — PROGRESS NOTES
Dr Humberto Beverly on phone report of pt with reactive strip but a BPP would be a good thing. Dr Humberto Beverly states that one of her Sonographers will come at lunch time and do it.

## 2017-01-30 NOTE — PROGRESS NOTES
Pt complained of headache. Pt rates pain 3/10. Tylenol 1000mg given po for pain. To call if no relief. Pt reports some sinus pressure and drainage.

## 2017-01-30 NOTE — ASSESSMENT & PLAN NOTE
Reassuring maternal and fetal statuses    Continue bid NST  Continue inpatient care  Deliver at 37 weeks, sooner if maternal or fetal indications.

## 2017-01-30 NOTE — PROGRESS NOTES
Patient sitting in bed watching TV with significant other at bedside. Denies any pain, headache, or blurry vision at this time.

## 2017-01-30 NOTE — PROGRESS NOTES
Ante Partum High Risk Pregnancy Note    Patient admitted for advanced maternal age, elevated blood pressure in physician's office  and preeclampsia states she does not  have  headache , abdominal pain  , contractions and right upper quadrant pain  . LOS:    Vitals: Temp (24hrs), Av.5 °F (36.9 °C), Min:98.3 °F (36.8 °C), Max:98.9 °F (37.2 °C)   Patient Vitals for the past 24 hrs:   BP   17 0835 123/74   17 0657 (!) 166/101   17 0243 143/87   17 2127 145/83   17 1800 147/83   17 1757 147/83       I&O:                      Exam:  Patient without distress. Abdomen: soft, non-tender               Fundus: soft and non tender                           Clonus: absent                           NST:  pending           Lab/Data Review: All lab results for the last 24 hours reviewed.     Assessment and Plan:      Principal Problem:    Pregnancy-induced hypertension in third trimester (2017)    Active Problems:    AMA (advanced maternal age) multigravida 35+ (2016)      Overview: 41 yo low risk NIPT      Growth scans      H/O  section (2016)      Overview: Plan repeat      HTN complicating peripregnancy, antepartum (2017)          Preeclampsia:  moderate  Deliver Wed   GBS +  24 hour urine 578mg

## 2017-01-30 NOTE — PROGRESS NOTES
Into room, bed linens changed. Ice water refilled. Pt without complaints. Dr Chao Nolan on the phone.  Radiology will do BPP this am.

## 2017-01-31 ENCOUNTER — ANESTHESIA EVENT (OUTPATIENT)
Dept: LABOR AND DELIVERY | Age: 42
End: 2017-01-31
Payer: COMMERCIAL

## 2017-01-31 PROCEDURE — 59025 FETAL NON-STRESS TEST: CPT

## 2017-01-31 PROCEDURE — 65270000029 HC RM PRIVATE

## 2017-01-31 NOTE — PROGRESS NOTES
Problem: Nutrition Deficit  Goal: *Optimize nutritional status  Nutrition  Reason for assessment: Length of stay day 5. Assessment:   Diet order(s): Regular  Food/Nutrition Patient History:  Pt admitted with pre-eclampsia in third trimester. Caesarean-section scheduled for 2/1. Pt reports she has been eating 100% of meals since she has been in hospital. For breakfast today she ate eggs, [place] toast and glass and for lunch she ate roast beef, mashed potatoes, cornbread, and chocolate cake. Stated she ordered corn but received rice, so did not eat the rice. Reports her  is bringing her Maty 80 before she goes NPO for surgery. She reports she has been very satisfied with her meals except for an egg sandwich. States she received it at room temperature a few days ago and \"was up all night throwing up. \" Pt states she is planning to breast feed and agrees to continue to eat regular meals and snacks after giving birth. Anthropometrics:Height: 5' 3\" (160 cm),  Weight: 110.3 kg (243 lb 4 oz), Weight Source: Standing scale (comment), Body mass index is 43.09 kg/(m^2). Macronutrient needs:  EER:  ~2758 kcal /day (25 kcal/kg actual BW)  EPR:  67-77 grams protein/day (0.8-1 gram/kg pre-pregnant IBW + 25g) (increased needs due to pregnancy and lactation)  Intake/Comparative Standards: No recorded intake at this time. Patient states she has been eating 100% of meals and dietetic intern observed 80% of lunch eaten today. This potentially meets ~100% of kcal and ~100% of protein needs. Nutrition Diagnosis: No nutrition diagnosis at this time. Intervention:  1. Continue current diet. Dietetic intern encouraged pt to continue to eat 100% of meals and snacks in between meals due to increased needs of pregnancy. 2. Education: dietetic intern educated patient on increased kcal, protein and fluid needs postpartum when breast feeding.  Intern emphasized importance of eating well-rounded meals 3x/day and snacks in between meals. Pt understood the importance of good nutrition while breast feeding.       Talon Martines, Dietetic Intern

## 2017-01-31 NOTE — PROGRESS NOTES
01/31/17 1025   Fetal Vital Signs   Mode External   Fetal Heart Rate 140   Fetal Activity Present   Variability 6-25 BPM   Decelerations None   Accelerations Yes   RN Reviewed Strip?  Yes   Non Stress Test Reactive   Uterine Activity   Mode External   Frequency (min) x3   Duration (sec) 40-60

## 2017-01-31 NOTE — PROGRESS NOTES
AM assessment complete at this time per flow sheet. Vital signs stable. Patient denies pain, vaginal bleeding, leaking of fluid, epigastric pain, visual disturbance, headache and contractions. See flowsheet for complete assessment. Patient states positive fetal movement. Placed on EFM and TOCO at this time. Encouraged to call as needed.

## 2017-01-31 NOTE — PROGRESS NOTES
RN at  performing shift assessment. Patient resting comfortably, without complaints. Denies HA, visual disturbances, or epigastric pain. Reflexes WNL. US held in place to obtain FHT's - 152-157 bpm. +FM per patient. No LOF or blood at this time, and abdomen soft/non-tender to palpation. Oriented to call light. Will call for assistance as needed. Denies needs at this time.

## 2017-01-31 NOTE — ADT AUTH CERT NOTES
Care Day: 4 Care Date: 2017 Level of Care: Inpatient Floor        Guideline Day 2        Clinical Status       ( ) * Blood pressure normal or adequately controlled       (X) * No seizure activity identified       (X) * Laboratory values normal or improved       (X) * Fetal status acceptable       (X) * Delivery not indicated imminently       ( ) * Discharge plans and education understood              Activity       (X) * Ambulatory              Routes       (X) * Oral hydration, medications, and diet              Interventions       (X) Fetal monitoring, including daily fetal movements              Medications       (X) Oral antihypertensives                                   * Milestone              Additional Notes       CONT STAY REVIEW FOR  17       Ante Partum High Risk Pregnancy Note               Patient admitted for advanced maternal age, elevated blood pressure in physician's office and preeclampsia states she does not have headache , abdominal pain , contractions and right upper quadrant pain .        Exam: Patient without distress.       Abdomen: soft, non-tender       Fundus: soft and non tender       Clonus: absent       NST:  pending       Assessment and Plan:                Principal Problem:       Pregnancy-induced hypertension in third trimester (2017)               Active Problems:       AMA (advanced maternal age) multigravida 35+ (2016)       Overview: 43 yo low risk NIPT       Growth scans               H/O  section (2016)       Overview: Plan repeat               HTN complicating peripregnancy, antepartum (2017)                 Preeclampsia: moderate Deliver Wed        GBS +       24 hour urine 578mg               Biophysical Profile               Indications- AMA, preeclampsia, nonreactive NST.                Fetal movement (0 or 2) = 2               Fetal tone (0 or 2) = 2               Fetal breathing (0 or 2) = 2               Amnionic fluid volume (0 or 2) = 2               SABINA = 10.68cm, Sum of DVPs for 4 quadrants.               Deepest Vertical Pocket (DVP) = 6.12cm               Total BPP Score = 8               Recommendations: Continue inpatient care; twice daily NST.  Delivery at 37 weeks,       sooner if maternal or fetal indications.                /101-134/54, REG DIET, FETAL MONITORING/NON STRESS                                                                   Hypertensive Disorders of Pregnancy - Care Day 3 (1/29/2017) by Mary Richter RN        Review Status Review Entered       Completed 1/31/2017       Details              Care Day: 3 Care Date: 1/29/2017 Level of Care: Inpatient Floor       Guideline Day 2        Clinical Status       ( ) * Blood pressure normal or adequately controlled       (X) * No seizure activity identified       (X) * Laboratory values normal or improved       (X) * Fetal status acceptable       (X) * Delivery not indicated imminently       ( ) * Discharge plans and education understood              Activity       (X) * Ambulatory              Routes       (X) * Oral hydration, medications, and diet              Interventions       (X) Fetal monitoring, including daily fetal movements              Medications       (X) Oral antihypertensives                                   * Milestone              Additional Notes       CONT STAY REVIEW FOR  1/29/17       Ante Partum High Risk Pregnancy Note       Patient admitted for elevated blood pressure in physician's office  states she does not have headache , abdominal pain , contractions, right upper quadrant pain , vaginal bleeding , swelling and vaginal leaking of fluid .        Exam: Patient without distress.       Abdomen: soft, non-tender       Fundus: soft and non tender       Fundal Height: 37 cm       Right Upper Quadrant: non-tender       Perineum: No sign of blood or amniotic fluid       Lower Extremities: No       Patellar Reflexes: 1+ bilaterally       Clonus: absent       Fetal Monitoring: No decels       Uterine Activity: None       NST:  reactive        Assessment and Plan:                Principal Problem:       Pregnancy-induced hypertension in third trimester (2017)               Active Problems:       AMA (advanced maternal age) multigravida 35+ (2016)       Overview: 41 yo low risk NIPT       Growth scans               H/O  section (2016)       Overview: Plan repeat               HTN complicating peripregnancy, antepartum (2017)       Preeclampsia: moderate Complete 48 hour course of steroids to promote fetal lung maturity.       Daily Fetal monitoring with Non-stress tests       Deliver for Hemolytic Anemia-Elevated Liver Enzymes- Low Platelet Count syndrome, fetal nonreasurrance or worsening maternal condition.        Plan to cont with hospitalization until delivery by repeat Csection on Wednesday, or sooner if indicated.        REG DIET, FETAL MONITORING/NON STRESS, HR 90-100s, //83, TYLENOL, PEPCID, ZOFRAN PO

## 2017-01-31 NOTE — PROGRESS NOTES
01/31/17 1707   Maternal Vital Signs   Pulse (Heart Rate) 98   Resp Rate 18   Level of Consciousness Alert   /77   MAP (Calculated) 102   BP 1 Method Automatic   BP 1 Location Right arm   BP Patient Position Sitting

## 2017-01-31 NOTE — PROGRESS NOTES
Ante Partum High Risk Pregnancy Note    Patient admitted for advanced maternal age and elevated blood pressure in physician's office  states she does not  have  headache , abdominal pain  , right upper quadrant pain   and vaginal bleeding . LOS:    Vitals: Temp (24hrs), Av.5 °F (36.9 °C), Min:97.8 °F (36.6 °C), Max:99.1 °F (37.3 °C)   Patient Vitals for the past 24 hrs:   BP   17 0957 165/77   17 0146 139/76   17 2158 124/61   17 1904 151/83   17 1650 137/77   17 1336 134/54   17 1315 134/54       I&O:                      Exam:  Patient without distress. Abdomen: soft, non-tender               Fundus: soft and non tender               NST pending  No clonus , DTR's normal         Lab/Data Review: All lab results for the last 24 hours reviewed.     Assessment and Plan:      Principal Problem:    Pre-eclampsia in third trimester (2017)    Active Problems:    AMA (advanced maternal age) multigravida 35+ (2016)      Overview: 41 yo low risk NIPT, normal mid trimester anatomy at [address]; accelerated       growth and normal anatomy (limited) with [facility] in third trimester            H/O  section (2016)      Overview: Plan repeat          Preeclampsia:  moderate  Csec tomorrow

## 2017-02-01 ENCOUNTER — SURGERY (OUTPATIENT)
Age: 42
End: 2017-02-01

## 2017-02-01 ENCOUNTER — ANESTHESIA (OUTPATIENT)
Dept: LABOR AND DELIVERY | Age: 42
End: 2017-02-01
Payer: COMMERCIAL

## 2017-02-01 LAB
ABO + RH BLD: NORMAL
BASE EXCESS BLDCOA CALC-SCNC: 1.9 MMOL/L (ref 0–3)
BASE EXCESS BLDCOV CALC-SCNC: 1.7 MMOL/L (ref 1.9–7.7)
BDY SITE: ABNORMAL
BDY SITE: ABNORMAL
BLOOD GROUP ANTIBODIES SERPL: NORMAL
ERYTHROCYTE [DISTWIDTH] IN BLOOD BY AUTOMATED COUNT: 14.9 % (ref 11.9–14.6)
HCO3 BLDCOA-SCNC: 30 MMOL/L (ref 22–26)
HCO3 BLDV-SCNC: 27 MMOL/L
HCT VFR BLD AUTO: 33.5 % (ref 35.8–46.3)
HGB BLD-MCNC: 10.7 G/DL (ref 11.7–15.4)
MCH RBC QN AUTO: 27.8 PG (ref 26.1–32.9)
MCHC RBC AUTO-ENTMCNC: 31.9 G/DL (ref 31.4–35)
MCV RBC AUTO: 87 FL (ref 79.6–97.8)
PCO2 BLDCOA: 61 MMHG (ref 33–49)
PCO2 BLDCOV: 47 MMHG (ref 14.1–43.3)
PH BLDCOA: 7.31 [PH] (ref 7.21–7.31)
PH BLDCOV: 7.38 [PH] (ref 7.2–7.44)
PLATELET # BLD AUTO: 284 K/UL (ref 150–450)
PMV BLD AUTO: 10.5 FL (ref 10.8–14.1)
PO2 BLDCOA: 16 MMHG (ref 9–19)
PO2 BLDV: 28 MMHG (ref 30.4–57.2)
RBC # BLD AUTO: 3.85 M/UL (ref 4.05–5.25)
SERVICE CMNT-IMP: ABNORMAL
SERVICE CMNT-IMP: ABNORMAL
SPECIMEN EXP DATE BLD: NORMAL
WBC # BLD AUTO: 12 K/UL (ref 4.3–11.1)

## 2017-02-01 PROCEDURE — 75410000003 HC RECOV DEL/VAG/CSECN EA 0.5 HR: Performed by: OBSTETRICS & GYNECOLOGY

## 2017-02-01 PROCEDURE — 74011250636 HC RX REV CODE- 250/636: Performed by: ANESTHESIOLOGY

## 2017-02-01 PROCEDURE — 74011250636 HC RX REV CODE- 250/636

## 2017-02-01 PROCEDURE — 77030003665 HC NDL SPN BBMI -A: Performed by: ANESTHESIOLOGY

## 2017-02-01 PROCEDURE — 82803 BLOOD GASES ANY COMBINATION: CPT

## 2017-02-01 PROCEDURE — 59025 FETAL NON-STRESS TEST: CPT

## 2017-02-01 PROCEDURE — 74011250636 HC RX REV CODE- 250/636: Performed by: OBSTETRICS & GYNECOLOGY

## 2017-02-01 PROCEDURE — 74011000250 HC RX REV CODE- 250

## 2017-02-01 PROCEDURE — 86900 BLOOD TYPING SEROLOGIC ABO: CPT | Performed by: OBSTETRICS & GYNECOLOGY

## 2017-02-01 PROCEDURE — 74011250637 HC RX REV CODE- 250/637: Performed by: ANESTHESIOLOGY

## 2017-02-01 PROCEDURE — 77030032490 HC SLV COMPR SCD KNE COVD -B: Performed by: OBSTETRICS & GYNECOLOGY

## 2017-02-01 PROCEDURE — 76010000391 HC C SECN FIRST 1 HR: Performed by: OBSTETRICS & GYNECOLOGY

## 2017-02-01 PROCEDURE — 65270000029 HC RM PRIVATE

## 2017-02-01 PROCEDURE — 77030002888 HC SUT CHRMC J&J -A: Performed by: OBSTETRICS & GYNECOLOGY

## 2017-02-01 PROCEDURE — 77030005518 HC CATH URETH FOL 2W BARD -B: Performed by: OBSTETRICS & GYNECOLOGY

## 2017-02-01 PROCEDURE — 77030002974 HC SUT PLN J&J -A: Performed by: OBSTETRICS & GYNECOLOGY

## 2017-02-01 PROCEDURE — 77030011640 HC PAD GRND REM COVD -A: Performed by: OBSTETRICS & GYNECOLOGY

## 2017-02-01 PROCEDURE — 77030007880 HC KT SPN EPDRL BBMI -B: Performed by: ANESTHESIOLOGY

## 2017-02-01 PROCEDURE — 74011250637 HC RX REV CODE- 250/637

## 2017-02-01 PROCEDURE — 77030031139 HC SUT VCRL2 J&J -A: Performed by: OBSTETRICS & GYNECOLOGY

## 2017-02-01 PROCEDURE — 36415 COLL VENOUS BLD VENIPUNCTURE: CPT | Performed by: OBSTETRICS & GYNECOLOGY

## 2017-02-01 PROCEDURE — 77030018836 HC SOL IRR NACL ICUM -A: Performed by: OBSTETRICS & GYNECOLOGY

## 2017-02-01 PROCEDURE — 4A1HXCZ MONITORING OF PRODUCTS OF CONCEPTION, CARDIAC RATE, EXTERNAL APPROACH: ICD-10-PCS | Performed by: OBSTETRICS & GYNECOLOGY

## 2017-02-01 PROCEDURE — 76010000392 HC C SECN EA ADDL 0.5 HR: Performed by: OBSTETRICS & GYNECOLOGY

## 2017-02-01 PROCEDURE — 76060000078 HC EPIDURAL ANESTHESIA: Performed by: OBSTETRICS & GYNECOLOGY

## 2017-02-01 PROCEDURE — 77030002966 HC SUT PDS J&J -A: Performed by: OBSTETRICS & GYNECOLOGY

## 2017-02-01 PROCEDURE — 85027 COMPLETE CBC AUTOMATED: CPT | Performed by: OBSTETRICS & GYNECOLOGY

## 2017-02-01 PROCEDURE — 77030002933 HC SUT MCRYL J&J -A: Performed by: OBSTETRICS & GYNECOLOGY

## 2017-02-01 PROCEDURE — 74011000250 HC RX REV CODE- 250: Performed by: ANESTHESIOLOGY

## 2017-02-01 PROCEDURE — 77030018846 HC SOL IRR STRL H20 ICUM -A: Performed by: OBSTETRICS & GYNECOLOGY

## 2017-02-01 RX ORDER — NALBUPHINE HYDROCHLORIDE 10 MG/ML
5 INJECTION, SOLUTION INTRAMUSCULAR; INTRAVENOUS; SUBCUTANEOUS
Status: ACTIVE | OUTPATIENT
Start: 2017-02-01 | End: 2017-02-02

## 2017-02-01 RX ORDER — TRISODIUM CITRATE DIHYDRATE AND CITRIC ACID MONOHYDRATE 500; 334 MG/5ML; MG/5ML
30 SOLUTION ORAL ONCE
Status: COMPLETED | OUTPATIENT
Start: 2017-02-01 | End: 2017-02-01

## 2017-02-01 RX ORDER — HYDROMORPHONE HYDROCHLORIDE 1 MG/ML
1 INJECTION, SOLUTION INTRAMUSCULAR; INTRAVENOUS; SUBCUTANEOUS
Status: DISPENSED | OUTPATIENT
Start: 2017-02-01 | End: 2017-02-02

## 2017-02-01 RX ORDER — DIPHENHYDRAMINE HYDROCHLORIDE 50 MG/ML
INJECTION, SOLUTION INTRAMUSCULAR; INTRAVENOUS AS NEEDED
Status: DISCONTINUED | OUTPATIENT
Start: 2017-02-01 | End: 2017-02-01 | Stop reason: HOSPADM

## 2017-02-01 RX ORDER — SODIUM CHLORIDE, SODIUM LACTATE, POTASSIUM CHLORIDE, CALCIUM CHLORIDE 600; 310; 30; 20 MG/100ML; MG/100ML; MG/100ML; MG/100ML
50 INJECTION, SOLUTION INTRAVENOUS CONTINUOUS
Status: ACTIVE | OUTPATIENT
Start: 2017-02-01 | End: 2017-02-02

## 2017-02-01 RX ORDER — LABETALOL 200 MG/1
200 TABLET, FILM COATED ORAL 3 TIMES DAILY
Status: DISCONTINUED | OUTPATIENT
Start: 2017-02-01 | End: 2017-02-02

## 2017-02-01 RX ORDER — NALOXONE HYDROCHLORIDE 0.4 MG/ML
0.4 INJECTION, SOLUTION INTRAMUSCULAR; INTRAVENOUS; SUBCUTANEOUS
Status: ACTIVE | OUTPATIENT
Start: 2017-02-01 | End: 2017-02-02

## 2017-02-01 RX ORDER — DEXAMETHASONE SODIUM PHOSPHATE 4 MG/ML
INJECTION, SOLUTION INTRA-ARTICULAR; INTRALESIONAL; INTRAMUSCULAR; INTRAVENOUS; SOFT TISSUE AS NEEDED
Status: DISCONTINUED | OUTPATIENT
Start: 2017-02-01 | End: 2017-02-01 | Stop reason: HOSPADM

## 2017-02-01 RX ORDER — FAMOTIDINE 10 MG/ML
20 INJECTION INTRAVENOUS ONCE
Status: COMPLETED | OUTPATIENT
Start: 2017-02-01 | End: 2017-02-01

## 2017-02-01 RX ORDER — LABETALOL 200 MG/1
TABLET, FILM COATED ORAL
Status: COMPLETED
Start: 2017-02-01 | End: 2017-02-01

## 2017-02-01 RX ORDER — OXYTOCIN/RINGER'S LACTATE 30/500 ML
PLASTIC BAG, INJECTION (ML) INTRAVENOUS
Status: DISCONTINUED | OUTPATIENT
Start: 2017-02-01 | End: 2017-02-01 | Stop reason: HOSPADM

## 2017-02-01 RX ORDER — SODIUM CHLORIDE 0.9 % (FLUSH) 0.9 %
5-10 SYRINGE (ML) INJECTION EVERY 8 HOURS
Status: DISCONTINUED | OUTPATIENT
Start: 2017-02-01 | End: 2017-02-01

## 2017-02-01 RX ORDER — ONDANSETRON 2 MG/ML
INJECTION INTRAMUSCULAR; INTRAVENOUS AS NEEDED
Status: DISCONTINUED | OUTPATIENT
Start: 2017-02-01 | End: 2017-02-01 | Stop reason: HOSPADM

## 2017-02-01 RX ORDER — METOCLOPRAMIDE 10 MG/1
10 TABLET ORAL ONCE
Status: COMPLETED | OUTPATIENT
Start: 2017-02-01 | End: 2017-02-01

## 2017-02-01 RX ORDER — CEFAZOLIN SODIUM IN 0.9 % NACL 2 G/50 ML
2 INTRAVENOUS SOLUTION, PIGGYBACK (ML) INTRAVENOUS
Status: COMPLETED | OUTPATIENT
Start: 2017-02-01 | End: 2017-02-01

## 2017-02-01 RX ORDER — ONDANSETRON 2 MG/ML
4 INJECTION INTRAMUSCULAR; INTRAVENOUS
Status: DISPENSED | OUTPATIENT
Start: 2017-02-01 | End: 2017-02-02

## 2017-02-01 RX ORDER — BUPIVACAINE HYDROCHLORIDE 7.5 MG/ML
INJECTION, SOLUTION INTRASPINAL AS NEEDED
Status: DISCONTINUED | OUTPATIENT
Start: 2017-02-01 | End: 2017-02-01 | Stop reason: HOSPADM

## 2017-02-01 RX ORDER — OXYTOCIN/RINGER'S LACTATE 30/500 ML
250 PLASTIC BAG, INJECTION (ML) INTRAVENOUS ONCE
Status: DISCONTINUED | OUTPATIENT
Start: 2017-02-01 | End: 2017-02-01

## 2017-02-01 RX ORDER — SODIUM CHLORIDE 0.9 % (FLUSH) 0.9 %
5-10 SYRINGE (ML) INJECTION AS NEEDED
Status: DISCONTINUED | OUTPATIENT
Start: 2017-02-01 | End: 2017-02-01

## 2017-02-01 RX ORDER — DEXTROSE, SODIUM CHLORIDE, SODIUM LACTATE, POTASSIUM CHLORIDE, AND CALCIUM CHLORIDE 5; .6; .31; .03; .02 G/100ML; G/100ML; G/100ML; G/100ML; G/100ML
125 INJECTION, SOLUTION INTRAVENOUS CONTINUOUS
Status: DISCONTINUED | OUTPATIENT
Start: 2017-02-01 | End: 2017-02-01

## 2017-02-01 RX ORDER — OXYCODONE HYDROCHLORIDE 5 MG/1
10 TABLET ORAL
Status: DISPENSED | OUTPATIENT
Start: 2017-02-01 | End: 2017-02-02

## 2017-02-01 RX ORDER — SODIUM CHLORIDE, SODIUM LACTATE, POTASSIUM CHLORIDE, CALCIUM CHLORIDE 600; 310; 30; 20 MG/100ML; MG/100ML; MG/100ML; MG/100ML
INJECTION, SOLUTION INTRAVENOUS
Status: DISCONTINUED | OUTPATIENT
Start: 2017-02-01 | End: 2017-02-01 | Stop reason: HOSPADM

## 2017-02-01 RX ORDER — ALBUTEROL SULFATE 90 UG/1
2 AEROSOL, METERED RESPIRATORY (INHALATION)
Status: DISCONTINUED | OUTPATIENT
Start: 2017-02-01 | End: 2017-02-04 | Stop reason: HOSPADM

## 2017-02-01 RX ORDER — MORPHINE SULFATE 0.5 MG/ML
INJECTION, SOLUTION EPIDURAL; INTRATHECAL; INTRAVENOUS AS NEEDED
Status: DISCONTINUED | OUTPATIENT
Start: 2017-02-01 | End: 2017-02-01 | Stop reason: HOSPADM

## 2017-02-01 RX ORDER — ONDANSETRON 2 MG/ML
4 INJECTION INTRAMUSCULAR; INTRAVENOUS
Status: DISCONTINUED | OUTPATIENT
Start: 2017-02-01 | End: 2017-02-04 | Stop reason: HOSPADM

## 2017-02-01 RX ADMIN — PROMETHAZINE HYDROCHLORIDE 6.25 MG: 25 INJECTION INTRAMUSCULAR; INTRAVENOUS at 23:22

## 2017-02-01 RX ADMIN — DIPHENHYDRAMINE HYDROCHLORIDE 15 MG: 50 INJECTION, SOLUTION INTRAMUSCULAR; INTRAVENOUS at 14:51

## 2017-02-01 RX ADMIN — Medication 250 ML/HR: at 14:36

## 2017-02-01 RX ADMIN — FAMOTIDINE 20 MG: 10 INJECTION, SOLUTION INTRAVENOUS at 10:52

## 2017-02-01 RX ADMIN — ONDANSETRON 4 MG: 2 INJECTION INTRAMUSCULAR; INTRAVENOUS at 14:38

## 2017-02-01 RX ADMIN — HYDROMORPHONE HYDROCHLORIDE 1 MG: 1 INJECTION, SOLUTION INTRAMUSCULAR; INTRAVENOUS; SUBCUTANEOUS at 17:30

## 2017-02-01 RX ADMIN — LABETALOL HYDROCHLORIDE 200 MG: 200 TABLET, FILM COATED ORAL at 17:51

## 2017-02-01 RX ADMIN — SODIUM CHLORIDE, SODIUM LACTATE, POTASSIUM CHLORIDE, AND CALCIUM CHLORIDE 1000 ML: 600; 310; 30; 20 INJECTION, SOLUTION INTRAVENOUS at 10:57

## 2017-02-01 RX ADMIN — ONDANSETRON 4 MG: 2 INJECTION INTRAMUSCULAR; INTRAVENOUS at 18:40

## 2017-02-01 RX ADMIN — SODIUM CITRATE AND CITRIC ACID MONOHYDRATE 30 ML: 500; 334 SOLUTION ORAL at 10:51

## 2017-02-01 RX ADMIN — CEFAZOLIN 2 G: 1 INJECTION, POWDER, FOR SOLUTION INTRAMUSCULAR; INTRAVENOUS; PARENTERAL at 13:49

## 2017-02-01 RX ADMIN — SODIUM CHLORIDE, SODIUM LACTATE, POTASSIUM CHLORIDE, CALCIUM CHLORIDE: 600; 310; 30; 20 INJECTION, SOLUTION INTRAVENOUS at 14:03

## 2017-02-01 RX ADMIN — SODIUM CHLORIDE, SODIUM LACTATE, POTASSIUM CHLORIDE, AND CALCIUM CHLORIDE 1000 ML: 600; 310; 30; 20 INJECTION, SOLUTION INTRAVENOUS at 10:16

## 2017-02-01 RX ADMIN — SODIUM CHLORIDE, SODIUM LACTATE, POTASSIUM CHLORIDE, CALCIUM CHLORIDE: 600; 310; 30; 20 INJECTION, SOLUTION INTRAVENOUS at 13:51

## 2017-02-01 RX ADMIN — DEXAMETHASONE SODIUM PHOSPHATE 8 MG: 4 INJECTION, SOLUTION INTRA-ARTICULAR; INTRALESIONAL; INTRAMUSCULAR; INTRAVENOUS; SOFT TISSUE at 14:40

## 2017-02-01 RX ADMIN — METOCLOPRAMIDE 10 MG: 10 TABLET ORAL at 10:51

## 2017-02-01 RX ADMIN — MORPHINE SULFATE 150 MCG: 0.5 INJECTION, SOLUTION EPIDURAL; INTRATHECAL; INTRAVENOUS at 14:00

## 2017-02-01 RX ADMIN — BUPIVACAINE HYDROCHLORIDE 1.7 ML: 7.5 INJECTION, SOLUTION INTRASPINAL at 14:00

## 2017-02-01 RX ADMIN — ONDANSETRON 4 MG: 2 INJECTION INTRAMUSCULAR; INTRAVENOUS at 22:45

## 2017-02-01 NOTE — PROGRESS NOTES
Dr Ivelisse Chapa unavailable to the OR due to an [address]. Dr Vladislav Turcios to the OR to start C/S. [facility] Student with Dr Vladislav Turcios. Introductions made to pt, pt agreeable to plan.  Md scrubbing

## 2017-02-01 NOTE — PROGRESS NOTES
Pt out of OR via bed to room 458 for PACU. sbar from [place], CRNA. VSS. Baby is skin to skin with mom. Mom plans to breastfeed. Ramsey draining clear yellow urine. Pt did not receive toradol in the OR due to asthma hx and ibuprofen and ASA allergies. Did receive decadron, benadryl and zofran. See MAR. Fundus firm at 1 fbb umbilicus. Scant rubra.  remains at bs.

## 2017-02-01 NOTE — ROUTINE PROCESS
SBAR IN Report: Mother    Verbal report received from Elisha Moyer RN (full name & credentials) on this patient, who is now being transferred from R (unit) for routine post - op. The patient is wearing a green \"Anesthesia-Duramorph\" band. Report consisted of patient's Situation, Background, Assessment and Recommendations (SBAR). [place] ID bands were compared with the identification form, and verified with the patient and transferring nurse. Information from the OR Summary and the [place] Report was reviewed with the transferring nurse; opportunity for questions and clarification provided.

## 2017-02-01 NOTE — ANESTHESIA POSTPROCEDURE EVALUATION
Post-Anesthesia Evaluation and Assessment    Patient: Chele Weeks MRN: [id number]  SSN: [id number]    YOB: 1975  Age: 42 y.o. Sex: female       Cardiovascular Function/Vital Signs  Visit Vitals    /87    Pulse 81    Temp 37 °C (98.6 °F)    Resp 18    Ht 5' 3\" (1.6 m)    Wt 110.3 kg (243 lb 4 oz)    SpO2 96%    Breastfeeding No    BMI 43.09 kg/m2       Patient is status post spinal anesthesia for Procedure(s):   SECTION. Nausea/Vomiting: None    Postoperative hydration reviewed and adequate. Pain:  Pain Scale 1: Numeric (0 - 10) (17)  Pain Intensity 1: 0 (17)   Managed    Neurological Status:   Neuro (WDL): Within Defined Limits (17)   At baseline    Mental Status and Level of Consciousness: Arousable    Pulmonary Status:   O2 Device: Room air (17)   Adequate oxygenation and airway patent    Complications related to anesthesia: None    Post-anesthesia assessment completed.  No concerns    Signed By: Dk Sanchez MD     2017

## 2017-02-01 NOTE — H&P
History & Physical    Name: Demetrius Lundborg MRN: [id number]  SSN: [id number]    YOB: 1975  Age: 42 y.o. Sex: female      Subjective:     Estimated Date of Delivery: 17  OB History    Para Term  AB SAB TAB Ectopic Multiple Living   2 1 1 0 0 0 0 0 0 1      # Outcome Date GA Lbr Ankit/2nd Weight Sex Delivery Anes PTL Lv   2 Current            1 Term     F CS-LTranv   Y          Ms. Patric Bustillos has been admitted with pregnancy at 37w0d now with chtn / preeclampsia for   section due to previous  section. Prenatal course was complicated by chronic hypertension. Please see prenatal records for details. Past Medical History   Diagnosis Date    Asthma      uses inhaler as needed    Endometrial polyp     H/O seasonal allergies      seasonal allergies    Pregnancy 2016     Past Surgical History   Procedure Laterality Date    Hx heent       sinus    Hx  section       Social History     Occupational History    Not on file.      Social History Main Topics    Smoking status: Never Smoker    Smokeless tobacco: Not on file    Alcohol use No      Comment: occasional before prenancy    Drug use: No    Sexual activity: No     Family History   Problem Relation Age of Onset    Heart Disease Mother     Heart Disease Father     Cancer Paternal Grandmother      colon    Malignant Hyperthermia Neg Hx     Pseudocholinesterase Deficiency Neg Hx     Delayed Awakening Neg Hx     Post-op Nausea/Vomiting Neg Hx     Emergence Delirium Neg Hx     Post-op Cognitive Dysfunction Neg Hx     Other Neg Hx     Breast Cancer Neg Hx        Allergies   Allergen Reactions    Aspirin Other (comments)     asthma    Daypro [Oxaprozin] Rash    Flaxseed Rash    Ibuprofen Other (comments)     asthma    Penicillins Other (comments)     Patient states MD told her not to take because she has a severe mold allergy    Yeast, Dried Unknown (comments)     Prior to Admission medications    Medication Sig Start Date End Date Taking? Authorizing Provider   omeprazole (PRILOSEC) 20 mg capsule Take 20 mg by mouth daily. Yes Historical Provider   OMEPRAZOLE (PRILOSEC PO) Take  by mouth. Historical Provider   raNITIdine (ZANTAC) 150 mg tablet Take 150 mg by mouth two (2) times a day. Historical Provider   PNV COMBO#47/IRON/FA #1/DHA (PNV-DHA PO) Take  by mouth. Historical Provider   fluticasone (FLONASE) 50 mcg/actuation nasal spray 2 Sprays by Both Nostrils route nightly. Historical Provider   cetirizine (ZYRTEC) 10 mg tablet Take 10 mg by mouth nightly. Historical Provider   albuterol (PROVENTIL, VENTOLIN) 90 mcg/Actuation inhaler Take 2 Puffs by inhalation every six (6) hours as needed. Patient instructed to bring hospital DOS     Historical Provider        Review of Systems: A comprehensive review of systems was negative except for that written in the History of Present Illness. Objective:     Vitals:  Vitals:    17 1707 17 1946 17 2313 17 0416   BP: 153/77 159/88 122/66 156/82   Pulse: 98 94 90 92   Resp: 18      Temp:    97.9 °F (36.6 °C)   Weight:       Height:            Physical Exam:  Patient without distress. Heart: Regular rate and rhythm or S1S2 present  Lung: clear to auscultation throughout lung fields, no wheezes, no rales, no rhonchi and normal respiratory effort  Abdomen: soft, nontender  Lower Extremities:  - Edema No  Membranes:  Intact  Fetal Heart Rate: Reactive    Prenatal Labs:   Lab Results   Component Value Date/Time    Rubella, External 4.77 2016    HBsAg, External NEG 2016    HIV, External NR 2016    RPR, External NR 2016    ABO,Rh O POSITIVE 2016         Impression/Plan:     Plan:  Admit for  section. Group B Strep was positive. Pt will receive preop antibiotics.  Discussed the risks of surgery including the risks of bleeding, infection, deep vein thrombosis, and surgical injuries to internal organs including but not limited to the bowels, bladder, rectum, and female reproductive organs. The patient understands the risks; any and all questions were answered to the patient's satisfaction.     Signed By:  Malachi Avelar DO     February 1, 2017

## 2017-02-01 NOTE — PROGRESS NOTES
SBAR OUT Report: Mother    Verbal report given to Carin Boudreaux RN (full name & credentials) on this patient, who is now being transferred to [employer] (unit) for routine post - op. The patient is wearing a green \"Anesthesia-Duramorph\" band. Report consisted of patient's Situation, Background, Assessment and Recommendations (SBAR).  ID bands were compared with the identification form, and verified with the patient and receiving nurse. Information from the SBAR, OR Summary, Procedure Summary, Intake/Output, MAR and Recent Results and the [place] Report was reviewed with the receiving nurse; opportunity for questions and clarification provided.

## 2017-02-01 NOTE — PROGRESS NOTES
Pericare/pad change. Bleeding wnl. Given dilaudid for pain. bp trending up. Dr Fany Otero called by [facility]. Pt to be started on labetolol TID. Ramsey emptied 400mls , clear urine.

## 2017-02-01 NOTE — ANESTHESIA PROCEDURE NOTES
Spinal Block    Start time: 2/1/2017 1:53 PM  End time: 2/1/2017 1:58 PM  Performed by: Dilma Plummer  Authorized by: Dlima Plummer     Pre-procedure:   Indications: at surgeon's request and primary anesthetic  Preanesthetic Checklist: patient identified, risks and benefits discussed, anesthesia consent, site marked, patient being monitored and timeout performed    Timeout Time: 13:53          Spinal Block:   Patient Position:  Seated  Prep Region:  Lumbar  Prep: chlorhexidine and patient draped      Location:  L3-4  Technique:  Single shot  Local:  Lidocaine 1%  Local Dose (mL):  3    Needle:   Needle Type:  Pencan  Needle Gauge:  25 G  Attempts:  1      Events: CSF confirmed, no blood with aspiration and no paresthesia        Assessment:  Insertion:  Uncomplicated  Patient tolerance:  Patient tolerated the procedure well with no immediate complications

## 2017-02-01 NOTE — ANESTHESIA PREPROCEDURE EVALUATION
Anesthetic History   No history of anesthetic complications            Review of Systems / Medical History  Patient summary reviewed and pertinent labs reviewed    Pulmonary            Asthma (Inhaler today) : well controlled       Neuro/Psych   Within defined limits           Cardiovascular    Hypertension: well controlled              Exercise tolerance: >4 METS  Comments: Denies CV history outside of HTN   GI/Hepatic/Renal  Within defined limits              Endo/Other        Morbid obesity     Other Findings   Comments: Pre E         Physical Exam    Airway  Mallampati: III  TM Distance: 4 - 6 cm  Neck ROM: normal range of motion   Mouth opening: Normal     Cardiovascular    Rhythm: regular  Rate: normal         Dental         Pulmonary  Breath sounds clear to auscultation               Abdominal  GI exam deferred       Other Findings            Anesthetic Plan    ASA: 3  Anesthesia type: spinal      Post-op pain plan if not by surgeon: intrathecal opiates      Anesthetic plan and risks discussed with: Patient and Spouse

## 2017-02-01 NOTE — OP NOTES
[facility]  [id number]      INTRAUTERINE PREGNANCY  SECTION FULL OP NOTE        DATE OF PROCEDURE:  2017    PREOPERATIVE DIAGNOSIS:  H/O  section [Z98.891], 37 weeks gestation, preeclampsia without severe features. AMA    POSTOPERATIVE DIAGNOSIS:  same with viable infant     PROCEDURE: Intrauterine pregnancy,  section    SURGEON:  Anastasia Ortiz MD    ASSISTANT:  Marky    ANESTHESIA: Spinal  EBL: 535 cc    COMPLICATIONS: None    Findings: viable male, apgars 8/9,, wt 8-11. Loose nuchal cord. Normal ovaries bilaterally. Uterus grossly normal with salpingiosis. over the fundus. Indications: 41 yo  at 37 weeks with planned repeat  section at 37 weeks due to preeclampsia without severe features. Patient had spinal placed and dosed and was on OR table when dr. Eric Desai was called to address an emergency situation. She asked that I proceed with c/s. OPERATIVE PROCEDURE: Patient was placed on the operating room table in the supine position/left lateral tilt. Time out was done to confirm the operating procedure, surgeon, patient and site. Once confirmed by the team, procedure was started. After having adequate regional anesthesia by spinal injection, the patient was prepped and draped in the usual fashion for abdominal surgery. A Pfannenstiel incision was made, the old scar was excised  And the incision carried down sharply through the subcutaneous tissue, and fascia incised. The incision was then extended laterally with the Leiva scissors. The peritoneum was  entered and the incision was extended vertically. DeLee bladder blade was then placed over the symphisis pubis. The visceroperitoneal reflection over the lower uterine segment was incised transversely and the bladder flap sharply and bluntly developed.  The lower uterine segment was thin and uterus was incised transversely, then extended bluntly cephalo caudad, and the infants head was delivered without difficulty and with  fundal pressure. Fluid was clear. 1 Nuchal cord x1 was easily reduced. Cord was clamped and cut. Mouth and nose were suctioned clean. The infant was given to the NICU team present at the time of delivery. A segment of cord was doubly clamped for cord gases/blood. The uterus was massaged and the  placenta was expressed. The uterus was exteriorized, wrapped in a wet lap square, curetted with a dry square, and closed in a double-layered fashion with #1 vicryl/#0 vicryl. . Hemostasis appeared adequate. The cul-de-sac was then irrigated and suctioned clean. The uterus was placed in the abdomen. The gutters were irrigated and suctioned clean. The peritoneum was closed with 2-0 vicryl, incorporating the rectus muscle. The fascia was closed with 0 PDS. Running 2-0 vicryl  was used to reapproximate the subcutaneous tissue incorporating Harshal's and Camper's fascia. 3-0 monocryl suture was used in a subcuticular stich and a pressure dressing was placed. The patient tolerated the procedure well and went to the recovery room in satisfactory condition. Counts were correct at the completion of the procedure. Patient received preoperative antibiotics, iv pitocin after delivery of the placenta.

## 2017-02-01 NOTE — PROGRESS NOTES
AM assessment complete at this time per flow sheet. Vital signs stable. Patient denies pain, vaginal bleeding, leaking of fluid, epigastric pain, visual disturbance and contractions. Dr. Yamileth Gutierrez also at bedside. Pt very upset to be awaken before 0800. Desires to sleep more before preparing for her repeat . Patient states positive fetal movement. Encouraged to call as needed.

## 2017-02-01 NOTE — PROGRESS NOTES
Baby boy at 56 via repeat  at [address] [facility] with a weight of 3945g (8lbs 11oz) and a length of 51cms  apgars of 8/9. [facility]. Will be breastfeeding.

## 2017-02-01 NOTE — PROGRESS NOTES
02/01/17 1101   Fetal Vital Signs   Mode External   Fetal Heart Rate 145   Fetal Activity Present   Variability 6-25 BPM   Decelerations None   Accelerations Yes   RN Reviewed Strip?  Yes   Non Stress Test Reactive   Uterine Activity   Mode External   Frequency (min) x1   Duration (sec) 40

## 2017-02-01 NOTE — PROGRESS NOTES
Spoke with Dr. Eric Desai and MD updated that per Diana Garnica RN blood pressure is increasing as charted with last reading 183/101. Order received for labetalol 200 mg po TID beginning now.

## 2017-02-01 NOTE — PROGRESS NOTES
jose guadalupe from [facility]. Pt will go to the OR after Dr Mey Vogt is finished with a delivery.

## 2017-02-02 LAB
ALBUMIN SERPL BCP-MCNC: 1.9 G/DL (ref 3.5–5)
ALBUMIN/GLOB SERPL: 0.5 {RATIO} (ref 1.2–3.5)
ALP SERPL-CCNC: 134 U/L (ref 50–136)
ALT SERPL-CCNC: 13 U/L (ref 12–65)
ANION GAP BLD CALC-SCNC: 3 MMOL/L (ref 7–16)
AST SERPL W P-5'-P-CCNC: 23 U/L (ref 15–37)
BASOPHILS # BLD AUTO: 0 K/UL (ref 0–0.2)
BASOPHILS # BLD: 0 % (ref 0–2)
BILIRUB SERPL-MCNC: 0.2 MG/DL (ref 0.2–1.1)
BUN SERPL-MCNC: 8 MG/DL (ref 6–23)
CALCIUM SERPL-MCNC: 8.5 MG/DL (ref 8.3–10.4)
CHLORIDE SERPL-SCNC: 103 MMOL/L (ref 98–107)
CO2 SERPL-SCNC: 28 MMOL/L (ref 21–32)
CREAT SERPL-MCNC: 0.69 MG/DL (ref 0.6–1)
DIFFERENTIAL METHOD BLD: ABNORMAL
EOSINOPHIL # BLD: 0 K/UL (ref 0–0.8)
EOSINOPHIL NFR BLD: 0 % (ref 0.5–7.8)
ERYTHROCYTE [DISTWIDTH] IN BLOOD BY AUTOMATED COUNT: 14.5 % (ref 11.9–14.6)
GLOBULIN SER CALC-MCNC: 3.5 G/DL (ref 2.3–3.5)
GLUCOSE SERPL-MCNC: 143 MG/DL (ref 65–100)
HCT VFR BLD AUTO: 31.8 % (ref 35.8–46.3)
HGB BLD-MCNC: 10.2 G/DL (ref 11.7–15.4)
IMM GRANULOCYTES # BLD: 0.1 K/UL (ref 0–0.5)
IMM GRANULOCYTES NFR BLD AUTO: 0.6 % (ref 0–5)
LYMPHOCYTES # BLD AUTO: 7 % (ref 13–44)
LYMPHOCYTES # BLD: 1.4 K/UL (ref 0.5–4.6)
MCH RBC QN AUTO: 27.2 PG (ref 26.1–32.9)
MCHC RBC AUTO-ENTMCNC: 32.1 G/DL (ref 31.4–35)
MCV RBC AUTO: 84.8 FL (ref 79.6–97.8)
MONOCYTES # BLD: 1.3 K/UL (ref 0.1–1.3)
MONOCYTES NFR BLD AUTO: 6 % (ref 4–12)
NEUTS SEG # BLD: 18.3 K/UL (ref 1.7–8.2)
NEUTS SEG NFR BLD AUTO: 86 % (ref 43–78)
PLATELET # BLD AUTO: 268 K/UL (ref 150–450)
PMV BLD AUTO: 10.2 FL (ref 10.8–14.1)
POTASSIUM SERPL-SCNC: 4.3 MMOL/L (ref 3.5–5.1)
PROT SERPL-MCNC: 5.4 G/DL (ref 6.3–8.2)
RBC # BLD AUTO: 3.75 M/UL (ref 4.05–5.25)
SODIUM SERPL-SCNC: 134 MMOL/L (ref 136–145)
WBC # BLD AUTO: 21.2 K/UL (ref 4.3–11.1)

## 2017-02-02 PROCEDURE — 65270000029 HC RM PRIVATE

## 2017-02-02 PROCEDURE — 36415 COLL VENOUS BLD VENIPUNCTURE: CPT | Performed by: OBSTETRICS & GYNECOLOGY

## 2017-02-02 PROCEDURE — 85025 COMPLETE CBC W/AUTO DIFF WBC: CPT | Performed by: OBSTETRICS & GYNECOLOGY

## 2017-02-02 PROCEDURE — 74011250637 HC RX REV CODE- 250/637: Performed by: ANESTHESIOLOGY

## 2017-02-02 PROCEDURE — 80053 COMPREHEN METABOLIC PANEL: CPT | Performed by: OBSTETRICS & GYNECOLOGY

## 2017-02-02 PROCEDURE — 74011250637 HC RX REV CODE- 250/637: Performed by: OBSTETRICS & GYNECOLOGY

## 2017-02-02 RX ORDER — SODIUM CHLORIDE, SODIUM LACTATE, POTASSIUM CHLORIDE, CALCIUM CHLORIDE 600; 310; 30; 20 MG/100ML; MG/100ML; MG/100ML; MG/100ML
125 INJECTION, SOLUTION INTRAVENOUS CONTINUOUS
Status: DISCONTINUED | OUTPATIENT
Start: 2017-02-02 | End: 2017-02-04 | Stop reason: ALTCHOICE

## 2017-02-02 RX ORDER — BETAMETHASONE VALERATE 1.2 MG/G
CREAM TOPICAL 2 TIMES DAILY
Status: DISCONTINUED | OUTPATIENT
Start: 2017-02-02 | End: 2017-02-04 | Stop reason: HOSPADM

## 2017-02-02 RX ORDER — OXYCODONE HYDROCHLORIDE 5 MG/1
10 TABLET ORAL
Status: DISCONTINUED | OUTPATIENT
Start: 2017-02-02 | End: 2017-02-04 | Stop reason: HOSPADM

## 2017-02-02 RX ORDER — ACETAMINOPHEN 325 MG/1
650 TABLET ORAL
Status: DISCONTINUED | OUTPATIENT
Start: 2017-02-02 | End: 2017-02-04 | Stop reason: HOSPADM

## 2017-02-02 RX ORDER — LABETALOL 100 MG/1
100 TABLET, FILM COATED ORAL 3 TIMES DAILY
Status: DISCONTINUED | OUTPATIENT
Start: 2017-02-02 | End: 2017-02-02

## 2017-02-02 RX ORDER — SIMETHICONE 80 MG
80 TABLET,CHEWABLE ORAL
Status: DISCONTINUED | OUTPATIENT
Start: 2017-02-02 | End: 2017-02-04 | Stop reason: HOSPADM

## 2017-02-02 RX ORDER — LABETALOL 100 MG/1
100 TABLET, FILM COATED ORAL 2 TIMES DAILY
Status: DISCONTINUED | OUTPATIENT
Start: 2017-02-02 | End: 2017-02-03

## 2017-02-02 RX ORDER — DIPHENHYDRAMINE HCL 25 MG
25 CAPSULE ORAL
Status: DISCONTINUED | OUTPATIENT
Start: 2017-02-02 | End: 2017-02-04 | Stop reason: HOSPADM

## 2017-02-02 RX ADMIN — BETAMETHASONE VALERATE: 1.2 CREAM TOPICAL at 11:49

## 2017-02-02 RX ADMIN — SIMETHICONE CHEW TAB 80 MG 80 MG: 80 TABLET ORAL at 15:16

## 2017-02-02 RX ADMIN — OXYCODONE HYDROCHLORIDE 10 MG: 5 TABLET ORAL at 18:42

## 2017-02-02 RX ADMIN — SIMETHICONE CHEW TAB 80 MG 80 MG: 80 TABLET ORAL at 23:24

## 2017-02-02 RX ADMIN — OXYCODONE HYDROCHLORIDE 10 MG: 5 TABLET ORAL at 13:35

## 2017-02-02 RX ADMIN — OXYCODONE HYDROCHLORIDE 10 MG: 5 TABLET ORAL at 02:20

## 2017-02-02 RX ADMIN — LABETALOL HYDROCHLORIDE 100 MG: 100 TABLET, FILM COATED ORAL at 23:24

## 2017-02-02 RX ADMIN — ACETAMINOPHEN 650 MG: 325 TABLET, FILM COATED ORAL at 23:24

## 2017-02-02 RX ADMIN — OXYCODONE HYDROCHLORIDE 10 MG: 5 TABLET ORAL at 09:05

## 2017-02-02 RX ADMIN — ACETAMINOPHEN 650 MG: 325 TABLET, FILM COATED ORAL at 18:43

## 2017-02-02 RX ADMIN — LABETALOL HYDROCHLORIDE 100 MG: 100 TABLET, FILM COATED ORAL at 11:51

## 2017-02-02 RX ADMIN — LABETALOL HYDROCHLORIDE 200 MG: 200 TABLET, FILM COATED ORAL at 02:33

## 2017-02-02 RX ADMIN — OXYCODONE HYDROCHLORIDE 10 MG: 5 TABLET ORAL at 23:24

## 2017-02-02 NOTE — PROGRESS NOTES
Spoke to Dr. Maria Del Carmen Mcintosh during rounds about pt's pressures, orders received to modify Labetalol to BID.

## 2017-02-02 NOTE — PROGRESS NOTES
Ramsey cath removed with cath tip intact. 1600 ml clear yellow urine emptied from bag upon removal.  IV fluids discontinued. Pt assisted out of bed to bathroom. Linens and gown changed. Nathaly care taught and performed by patient. Pt was unable to void at this time. Educated pt on need to void within next 6 hours, encouraged to attempt to void within next 2 hours. Encouraged increased fluid intake, pt verbalized understanding. Assisted pt back to bed without difficulty. Tolerated well. Will continue to monitor.

## 2017-02-02 NOTE — PROGRESS NOTES
Spoke to Dr. Nanette Moe regarding pt's complaint of tape burn after dressing removal, orders received for betamethasone cream.

## 2017-02-02 NOTE — PROGRESS NOTES
Report of care received from, Raiza Walker RN.  Bedside report given, pt denies further needs at present time

## 2017-02-02 NOTE — PROGRESS NOTES
Admission paperwork reviewed with patient. Shaken baby consent signed and placed in infants chart. Educated patient on importance of tracking I&O's on herself and infant, encouraged documentation. Pt verbalized understanding.

## 2017-02-02 NOTE — PROGRESS NOTES
Anesthesiology  Post-op Note    Post-op day 1 s/p  via spinal with neuraxial opioids for post-op pain management. Visit Vitals    /66 (BP 1 Location: Right arm, BP Patient Position: At rest)    Pulse 85    Temp 37 °C (98.6 °F)    Resp 18    Ht 5' 3\" (1.6 m)    Wt 110.3 kg (243 lb 4 oz)    LMP 2016    SpO2 99%    Breastfeeding Unknown    BMI 43.09 kg/m2     Airway patent, patient appropriately hydrated and appears euvolemic. Patient is Alert and oriented. Pain is well controlled. Pruritus is well controlled. Nausea is well controlled. No complaints about back or site of injection. Motor and sensory function has returned to baseline in lower extremities. Patient is satisfied with anesthetic and reports no complications. Continue current orders, then initiate surgeon's orders for pain management 24 hours after . Follow up per surgeon.

## 2017-02-02 NOTE — PROGRESS NOTES
Pt vomited 100 ml brown liquid. Requests zofran for nausea. Dr. Yoel Simpson notified, new orders received.

## 2017-02-02 NOTE — LACTATION NOTE
This note was copied from a baby's chart. In to check on feedings. Mom states baby latching but mostly short feeds. Baby just bathed and waking up showing feeding cues now. Reviewed feeding expectations for first 24 hours. Watch for feeding cues and feed on demand. Mom in some pain so laid almost flat in bed. Assisted on both breasts in modified football hold. Large everted nipples. Baby latched well, improved latch with manual lip flange. Reviewed keeping baby in close to breast to ensure deep latch. Baby did well, especially with stimulation to stay active. Observed on both breasts, did 20 minutes total. Will monitor feeds and output closely. 40 week infant, mom also AMA, did breastfeed first child with no issues but that was 12 years ago. May do some insurance pumping to help with milk supply, will monitor. RN updated.

## 2017-02-02 NOTE — LACTATION NOTE

## 2017-02-03 PROCEDURE — 65270000029 HC RM PRIVATE

## 2017-02-03 PROCEDURE — 74011250637 HC RX REV CODE- 250/637: Performed by: OBSTETRICS & GYNECOLOGY

## 2017-02-03 RX ADMIN — SIMETHICONE CHEW TAB 80 MG 80 MG: 80 TABLET ORAL at 08:35

## 2017-02-03 RX ADMIN — ACETAMINOPHEN 650 MG: 325 TABLET, FILM COATED ORAL at 12:56

## 2017-02-03 RX ADMIN — ACETAMINOPHEN 650 MG: 325 TABLET, FILM COATED ORAL at 03:57

## 2017-02-03 RX ADMIN — OXYCODONE HYDROCHLORIDE 10 MG: 5 TABLET ORAL at 16:48

## 2017-02-03 RX ADMIN — SIMETHICONE CHEW TAB 80 MG 80 MG: 80 TABLET ORAL at 12:56

## 2017-02-03 RX ADMIN — OXYCODONE HYDROCHLORIDE 10 MG: 5 TABLET ORAL at 08:35

## 2017-02-03 RX ADMIN — BETAMETHASONE VALERATE: 1.2 CREAM TOPICAL at 09:00

## 2017-02-03 RX ADMIN — OXYCODONE HYDROCHLORIDE 10 MG: 5 TABLET ORAL at 12:56

## 2017-02-03 RX ADMIN — ACETAMINOPHEN 650 MG: 325 TABLET, FILM COATED ORAL at 20:35

## 2017-02-03 RX ADMIN — OXYCODONE HYDROCHLORIDE 10 MG: 5 TABLET ORAL at 03:57

## 2017-02-03 RX ADMIN — SIMETHICONE CHEW TAB 80 MG 80 MG: 80 TABLET ORAL at 20:41

## 2017-02-03 RX ADMIN — SIMETHICONE CHEW TAB 80 MG 80 MG: 80 TABLET ORAL at 16:48

## 2017-02-03 RX ADMIN — ACETAMINOPHEN 650 MG: 325 TABLET, FILM COATED ORAL at 08:35

## 2017-02-03 RX ADMIN — OXYCODONE HYDROCHLORIDE 10 MG: 5 TABLET ORAL at 20:36

## 2017-02-03 RX ADMIN — ACETAMINOPHEN 650 MG: 325 TABLET, FILM COATED ORAL at 16:48

## 2017-02-03 NOTE — PROGRESS NOTES
Post-Operative Day Number 2 Progress Note    Patient doing well post-op day 2 from  delivery without significant complaints. Pain controlled on current medication. Voiding without difficulty, normal lochia. Vitals:  Patient Vitals for the past 8 hrs:   BP Temp Pulse Resp   17 0733 113/55 98.3 °F (36.8 °C) 80 18     Temp (24hrs), Av.3 °F (36.8 °C), Min:97.9 °F (36.6 °C), Max:98.8 °F (37.1 °C)      Vital signs stable, afebrile. Exam:  Patient without distress. Abdomen soft, fundus firm at level of umbilicus, non tender. Incision dry and clean without erythema. Lower extremities are negative for swelling, cords or tenderness. Lab/Data Review: All lab results for the last 24 hours reviewed. Assessment and Plan:  Patient appears to be having uncomplicated post- course. Continue routine post-op care and maternal education.   BP has been low normal will discontinue labetalol

## 2017-02-03 NOTE — LACTATION NOTE
This note was copied from a baby's chart. Mom called out for lactation. Tearful, attempted at last feeding but baby would not latch, arching and crying at the breast, continues to be fussy now. Mom pumped and retrieved drops again. Mom and Dad anxious about baby feeding. Discussed options, no medical need for supplement but parents want to proceed with formula via bottle. Assisted Dad to feed baby via bottle and taught burping with return demonstration by Dad. Baby took 15ml via bottle, calm and quiet. Encouraged mom to continue to attempt at the breast, call out for assistance as needed. Pump if no latch and give back all pumped colostrum, supplement if desired. Mom states understanding.

## 2017-02-03 NOTE — PROGRESS NOTES
Pt medicated for pain rated 8 on 0-10 scale with 650 mg tylenol and 10 mg roxicodone per PRN orders.

## 2017-02-03 NOTE — LACTATION NOTE
This note was copied from a baby's chart. In to check on feedings. Mom states baby will not latch well anymore. Has not latched well since 2200, only did a 7 minute feeding this morning. Baby just had circumcision and has been sleeping per mom, not latch at attempt after circumcision. Encouraged mom to begin pumping for breast stimulation and to give infant some pumped milk. Mom agreeable. Pump set up with full instruction on use. Mom pumped drops and gave to baby on finger. Discussed need for baby to feed at least 8 times in 24 hours. Continue with latch attempt first and pump if no latch. Mom agreeable. RN updated.

## 2017-02-03 NOTE — LACTATION NOTE
This note was copied from a baby's chart. Assisted mother to latch infant to left breast.  Mother requests to hold infant in cradle hold and states \"infant does not like football hold\" and she does not want to try football hold at this time. Infant latched after several attempts and sucked approximately 2 times. Infant fussy and unable to obtain consistent latch. Encouraged mother to hold infant skin to skin and attempt again when infant begins to show hunger cues. Mother verbalized understanding. Will continue to monitor.

## 2017-02-03 NOTE — PROGRESS NOTES
Report received from [address] [place] [address]. Plan of care discussed. Care assumed. Mother resting in bed.

## 2017-02-04 VITALS
WEIGHT: 243.25 LBS | TEMPERATURE: 98 F | SYSTOLIC BLOOD PRESSURE: 126 MMHG | OXYGEN SATURATION: 99 % | HEART RATE: 65 BPM | DIASTOLIC BLOOD PRESSURE: 58 MMHG | BODY MASS INDEX: 43.1 KG/M2 | RESPIRATION RATE: 18 BRPM | HEIGHT: 63 IN

## 2017-02-04 PROCEDURE — 74011250637 HC RX REV CODE- 250/637: Performed by: OBSTETRICS & GYNECOLOGY

## 2017-02-04 RX ORDER — OXYCODONE HYDROCHLORIDE 10 MG/1
10 TABLET ORAL
Qty: 40 TAB | Refills: 0 | Status: SHIPPED | OUTPATIENT
Start: 2017-02-04 | End: 2017-02-16 | Stop reason: ALTCHOICE

## 2017-02-04 RX ADMIN — ACETAMINOPHEN 650 MG: 325 TABLET, FILM COATED ORAL at 04:34

## 2017-02-04 RX ADMIN — OXYCODONE HYDROCHLORIDE 10 MG: 5 TABLET ORAL at 00:37

## 2017-02-04 RX ADMIN — ACETAMINOPHEN 650 MG: 325 TABLET, FILM COATED ORAL at 13:17

## 2017-02-04 RX ADMIN — OXYCODONE HYDROCHLORIDE 10 MG: 5 TABLET ORAL at 04:34

## 2017-02-04 RX ADMIN — ACETAMINOPHEN 650 MG: 325 TABLET, FILM COATED ORAL at 00:37

## 2017-02-04 RX ADMIN — ACETAMINOPHEN 650 MG: 325 TABLET, FILM COATED ORAL at 08:35

## 2017-02-04 RX ADMIN — OXYCODONE HYDROCHLORIDE 10 MG: 5 TABLET ORAL at 08:35

## 2017-02-04 RX ADMIN — OXYCODONE HYDROCHLORIDE 10 MG: 5 TABLET ORAL at 12:14

## 2017-02-04 RX ADMIN — SIMETHICONE CHEW TAB 80 MG 80 MG: 80 TABLET ORAL at 08:35

## 2017-02-04 NOTE — LACTATION NOTE

## 2017-02-04 NOTE — DISCHARGE INSTRUCTIONS
Discharge instruction to follow: Activity: Pelvis rest for 6 weeks     No heavy lifting over 15 lbs for 2 weeks     No driving for 2 weeks     No push/pull motion such as sweeping or vacuuming for 2 weeks     No tub baths for 6 weeks     section keep incision clean and dry, may shower as normal with soap and water. Inspect incision every day for signs of infection listed below. Continue to use ruth-bottle with every void or bowel movement until comfortable stopping. Change sanitary pad after each urination or bowel movement. Call MD for the following:      Fever over 101 F; pain not relieved by medication; foul smelling vaginal discharge or increase in vaginal bleeding. Redness, swelling, or drainage from  incision. Take medication as prescribed. Follow up with MD as order.  Section: What to Expect at [address] [place] [address]    A  section, or , is surgery to deliver your baby through a cut, called an incision, that the doctor makes in your lower belly and uterus. You may have some pain in your lower belly and need pain medicine for 1 to 2 weeks. You can expect some vaginal bleeding for several weeks. You will probably need about 6 weeks to fully recover. It is important to take it easy while the incision is healing. Avoid heavy lifting, strenuous activities, or exercises that strain the belly muscles while you are recovering. Ask a family member or friend for help with housework, cooking, and shopping. This care sheet gives you a general idea about how long it will take for you to recover. But each person recovers at a different pace. Follow the steps below to get better as quickly as possible. How can you care for yourself at home? Activity  · Rest when you feel tired. Getting enough sleep will help you recover. · Try to walk each day. Start by walking a little more than you did the day before. Bit by bit, increase the amount you walk.  Walking boosts blood flow and helps prevent pneumonia, constipation, and blood clots. · Avoid strenuous activities, such as bicycle riding, jogging, weightlifting, and aerobic exercise, for 6 weeks or until your doctor says it is okay. · Until your doctor says it is okay, do not lift anything heavier than your baby. · Do not do sit-ups or other exercises that strain the belly muscles for 6 weeks or until your doctor says it is okay. · Hold a pillow over your incision when you cough or take deep breaths. This will support your belly and decrease your pain. · You may shower as usual. Pat the incision dry when you are done. · You will have some vaginal bleeding. Wear sanitary pads. Do not douche or use tampons until your doctor says it is okay. · Ask your doctor when you can drive again. · You will probably need to take at least 6 weeks off work. It depends on the type of work you do and how you feel. · Ask your doctor when it is okay for you to have sex. Diet  · You can eat your normal diet. If your stomach is upset, try bland, low-fat foods like plain rice, broiled chicken, toast, and yogurt. · Drink plenty of fluids (unless your doctor tells you not to). · You may notice that your bowel movements are not regular right after your surgery. This is common. Try to avoid constipation and straining with bowel movements. You may want to take a fiber supplement every day. If you have not had a bowel movement after a couple of days, ask your doctor about taking a mild laxative. · If you are breastfeeding, do not drink any alcohol. Medicines  · Your doctor will tell you if and when you can restart your medicines. He or she will also give you instructions about taking any new medicines. · If you take blood thinners, such as warfarin (Coumadin), clopidogrel (Plavix), or aspirin, be sure to talk to your doctor. He or she will tell you if and when to start taking those medicines again.  Make sure that you understand exactly what your doctor wants you to do. · Take pain medicines exactly as directed. ¨ If the doctor gave you a prescription medicine for pain, take it as prescribed. ¨ If you are not taking a prescription pain medicine, ask your doctor if you can take an over-the-counter medicine. · If you think your pain medicine is making you sick to your stomach:  ¨ Take your medicine after meals (unless your doctor has told you not to). ¨ Ask your doctor for a different pain medicine. · If your doctor prescribed antibiotics, take them as directed. Do not stop taking them just because you feel better. You need to take the full course of antibiotics. Incision care  · If you have strips of tape on the incision, leave the tape on for a week or until it falls off. · Wash the area daily with warm, soapy water, and pat it dry. Don't use hydrogen peroxide or alcohol, which can slow healing. You may cover the area with a gauze bandage if it weeps or rubs against clothing. Change the bandage every day. · Keep the area clean and dry. Other instructions  · If you breastfeed your baby, you may be more comfortable while you are healing if you place the baby so that he or she is not resting on your belly. Try tucking your baby under your arm, with his or her body along the side you will be feeding on. Support your baby's upper body with your arm. With that hand you can control your baby's head to bring his or her mouth to your breast. This is sometimes called the football hold. Follow-up care is a key part of your treatment and safety. Be sure to make and go to all appointments, and call your doctor if you are having problems. It's also a good idea to know your test results and keep a list of the medicines you take. When should you call for help? Call 911 anytime you think you may need emergency care. For example, call if:  · You passed out (lost consciousness). · You have symptoms of a blood clot in your lung (called a pulmonary embolism).  These may include:  ¨ Sudden chest pain. ¨ Trouble breathing. ¨ Coughing up blood. · You have thoughts of harming yourself, your baby, or another person. Call your doctor now or seek immediate medical care if:  · You have severe vaginal bleeding. This means that you are soaking through a pad every hour for 2 or more hours. · You are dizzy or lightheaded, or you feel like you may faint. · You have new or more belly pain. · You have loose stitches, or your incision comes open. · You have symptoms of infection, such as:  ¨ Increased pain, swelling, warmth, or redness. ¨ Red streaks leading from the incision. ¨ Pus draining from the incision. ¨ A fever. · You have symptoms of a blood clot in your leg (called a deep vein thrombosis), such as:  ¨ Pain in your calf, back of the knee, thigh, or groin. ¨ Redness and swelling in your leg or groin. Watch closely for changes in your health, and be sure to contact your doctor if:  · You feel sad, anxious, or hopeless for more than a few days. · You do not get better as expected. Where can you learn more? Go to http://yan-rita.info/. Enter M806 in the search box to learn more about \" Section: What to Expect at Home. \"  Current as of: May 30, 2016  Content Version: 11.1  © 2271-9518 [employer]. Care instructions adapted under license by [employer] (which disclaims liability or warranty for this information). If you have questions about a medical condition or this instruction, always ask your healthcare professional. [place] [address] any warranty or liability for your use of this information.

## 2017-02-04 NOTE — LACTATION NOTE
This note was copied from a baby's chart. In to see mom and infant for discharge. Mom has been attempting at breast, and if not feeding well, pumping and offering back expressed milk and supplementation. Feeding plan printed and reviewed with mom- she verbalized understanding how to use. Reviewed how to manage period of engorgement and discharge instructions. Mom had no questions or needs. She has personal pump to use at home as needed. Offered lactation outpatient appt but denied at this time.

## 2017-02-04 NOTE — LACTATION NOTE
This note was copied from a baby's chart. Individualized Feeding Plan for Breastfeeding   Lactation Services [phone]  As much as possible, hold your baby on your chest so babys bare skin is against your bare skin with a blanket covering babys back, especially 30 minutes before it is time for baby to eat. Watch for early feeding cues such as, licking lips, sucking motions, rooting, hands to mouth. Crying is a late feeding cue. Feed your baby at least 8 times in 24 hours, or more if your baby is showing feeding cues. If baby is sleepy put baby skin to skin and watch for hunger cues. To rouse baby: unwrap, undress, massage hands, feet, & back, change diaper, cool washcloth, gently change babys position from lying to sitting. 15-20 minutes on the first breast of active breastfeeding is considered a good feeding. Good, active breastfeeding is when baby is alert, tugging the nipple, their ear may move, and you can hear swallows. Allow baby to finish the first side before changing sides. Sleeping at the breast or only brief, light sucks should not be considered a good, full breastfeed. At each feeding:  __x__1. Do Suck Practice on finger before each feeding until sucking pattern is smooth. Try using index finger. Nail down towards tongue. __x__2. Hand Express for a few minutes prior to latching to help start milk flow. __x__3. Baby needs to NURSE WELL x 15-20 minutes on at least first breast, burp and offer 2nd breast at every feeding. If no sustained latch only attempt at breast for 10 minutes. If baby does not latch on and feed well on at least one side, you should:   __x__4. Double pump for 15 minutes with breast massage and compression. Hand express for an additional 2-3 minutes per side. Pump after each feeding attempt or poor feeding, up to 8 times per day. If you are not putting baby to the breast you need to pump 8 times a day.  Pump every at least every 3 hours.    __x__5. Give baby all of the breast milk you obtain using a straight syringe or  curved syringe. If baby does NOT have enough wet and dirty diapers per day, is jaundiced/lethargic, or has significant weight loss AND you do NOT pump enough milk for each feeding (per volume listed below), formula supplementation may need to be used. Call lactation department /pediatrician if you have concerns. AVERAGE INTAKES OF COLOSTRUM BY HEALTHY  INFANTS:  Time  Day Intake (ml/feed)  1st 24 hrs  1 2-10 ml  24-48 hrs  2 5-15 ml  48-72 hrs  3 15-30 ml (0.5-1 oz)  72-96 hrs  4 30-45 ml (1-1.5oz)                          5-6      45-60 ml (1.5-2oz)                           7          75-90ml (2.5-3oz)    By day 7, baby will need 90 ml or 3 oz at each feeding based on 8 feedings per day & babys weight. (1oz = 30ml). Total milk volume needed in 24 hours by Day 7 is 24 oz per day based on baby's birthweight of 8lb 11oz. Comments:     Use feeding plan until follow up with pediatrician. Continue to attempt at the breast for most feeds. Pump every 3 hours if no latch. Give all pumped colostrum/breastmilk at each feeding. OUTPATIENT APPOINTMENT SCHEDULED FOR :       Outpatient services are located on the 4th floor at [facility]. Check in at the 4th floor registration desk (the same one you used when you came to have your baby). Call for questions [phone]     Breastfeeding Support Group: Meets most months in [address] in [address]. Days and times may vary. Please call [phone] or visit our website [email or web address] for the most current information. Support Group is free, but please register that you plan to attend.

## 2017-02-04 NOTE — DISCHARGE SUMMARY
Obstetrical Discharge Summary     Name: Meg Sorensen MRN: [id number]  SSN: [id number]    YOB: 1975  Age: 42 y.o. Sex: female      Admit Date: 2017    Discharge Date: 2017     Admitting Physician: Louann Larson DO     Attending Physician:  Nanette Negro DO     * Admission Diagnoses: H/O  section [Z98.891];39 weeks gestation of pregna*    * Discharge Diagnoses:   Information for the patient's :  Kailee Dugan [[id number]]   Delivery of a 8 lb 11.2 oz (3.945 kg) male infant via , Low Transverse on 2017 at 2:35 PM  by . Apgars were 8 and 9. Additional Diagnoses:   Hospital Problems as of 2017  Date Reviewed: 2017          Codes Class Noted - Resolved POA     delivery delivered ICD-10-CM: O82  ICD-9-CM: 669.71  2017 - Present Unknown        * (Principal)Pre-eclampsia in third trimester ICD-10-CM: O14.93  ICD-9-CM: 642.43  2017 - Present Yes        AMA (advanced maternal age) multigravida 33+ ICD-10-CM: O09.529  ICD-9-CM: 659.60  2016 - Present Yes    Overview Addendum 2017 12:10 PM by Nancy Perez MD     41 yo low risk NIPT, normal mid trimester anatomy at [address]; accelerated growth and normal anatomy (limited) with [facility] in third trimester               H/O  section ICD-10-CM: Z98.891  ICD-9-CM: V45.89  2016 - Present Yes    Overview Signed 2016  9:24 AM by Brandon Pérez MD     Plan repeat                  Lab Results   Component Value Date/Time    ABO/Rh(D) O POSITIVE 2017 07:23 AM    Rubella, External 4.77 2016    GrBStrep, External Positive (Clindamycin Resistant) 2017    ABO,Rh O POSITIVE 2016    There is no immunization history for the selected administration types on file for this patient.     * Procedures:   Procedure(s) with comments:   SECTION - kenzie 17  Repeat c/s; AMA  39w0d           * Discharge Condition: good    [facility] Course: Normal hospital course following the delivery. * Disposition: Home    Discharge Medications:   Current Discharge Medication List      START taking these medications    Details   oxyCODONE IR (ROXICODONE) 10 mg tab immediate release tablet Take 1 Tab by mouth every four (4) hours as needed. Max Daily Amount: 60 mg.  Qty: 40 Tab, Refills: 0    Associated Diagnoses: H/O  section         CONTINUE these medications which have NOT CHANGED    Details   !! omeprazole (PRILOSEC) 20 mg capsule Take 20 mg by mouth daily. !! OMEPRAZOLE (PRILOSEC PO) Take  by mouth. raNITIdine (ZANTAC) 150 mg tablet Take 150 mg by mouth two (2) times a day. PNV COMBO#47/IRON/FA #1/DHA (PNV-DHA PO) Take  by mouth. fluticasone (FLONASE) 50 mcg/actuation nasal spray 2 Sprays by Both Nostrils route nightly. cetirizine (ZYRTEC) 10 mg tablet Take 10 mg by mouth nightly. albuterol (PROVENTIL, VENTOLIN) 90 mcg/Actuation inhaler Take 2 Puffs by inhalation every six (6) hours as needed. Patient instructed to bring hospital DOS        ! ! - Potential duplicate medications found. Please discuss with provider. * Follow-up Care/Patient Instructions:   Activity: No lifting, Driving, or Strenuous exercise for 6 and No sex for 2 weeks  Diet: Regular Diet  Wound Care: Keep wound clean and dry    Follow-up Information     Follow up With Details Comments Contact Info    Starla Negro DO Schedule an appointment as soon as possible for a visit in 2 weeks call office for an appointment [id number] [place] [id number] [place] [address] 18 [address]  [phone]             Signed By:  Ricardo Hui MD     2017

## 2017-02-04 NOTE — PROGRESS NOTES
Post-Operative Day Number 3 Progress/Discharge Note    Patient doing well post-op day 3 from  delivery without significant complaints. Pain controlled on current medication. Voiding without difficulty, normal lochia. Vitals:  Patient Vitals for the past 8 hrs:   BP Temp Pulse Resp   17 0751 126/58 98 °F (36.7 °C) 65 18     Temp (24hrs), Av.3 °F (36.8 °C), Min:98 °F (36.7 °C), Max:98.5 °F (36.9 °C)      Vital signs stable, afebrile. Exam:  Patient without distress. Abdomen soft, fundus firm at level of umbilicus, non tender. Incision dry and                      clean without erythema. Lower extremities are negative for swelling, cords or tenderness. Lab/Data Review: All lab results for the last 24 hours reviewed. Assessment and Plan:  Patient appears to be having uncomplicated post- course. Continue routine post-op care and maternal education. Plan discharge for today with follow up in our office in 1-2 weeks.

## 2017-03-17 PROBLEM — Z30.430 ENCOUNTER FOR INSERTION OF PARAGARD IUD: Status: ACTIVE | Noted: 2017-03-17

## 2017-08-31 PROBLEM — O14.93 PRE-ECLAMPSIA IN THIRD TRIMESTER: Status: RESOLVED | Noted: 2017-01-26 | Resolved: 2017-08-31

## 2017-12-14 ENCOUNTER — HOSPITAL ENCOUNTER (OUTPATIENT)
Dept: MAMMOGRAPHY | Age: 42
Discharge: HOME | End: 2017-12-14
Attending: OBSTETRICS & GYNECOLOGY
Payer: COMMERCIAL

## 2017-12-14 DIAGNOSIS — Z01.419 WELL WOMAN EXAM: ICD-10-CM

## 2017-12-14 DIAGNOSIS — Z12.31 VISIT FOR SCREENING MAMMOGRAM: ICD-10-CM

## 2017-12-14 PROCEDURE — 77067 SCR MAMMO BI INCL CAD: CPT

## 2018-08-28 PROBLEM — E66.01 SEVERE OBESITY (BMI 35.0-39.9): Status: ACTIVE | Noted: 2018-08-28

## 2018-12-18 ENCOUNTER — HOSPITAL ENCOUNTER (OUTPATIENT)
Dept: MAMMOGRAPHY | Age: 43
Discharge: HOME | End: 2018-12-18
Attending: NURSE PRACTITIONER
Payer: COMMERCIAL

## 2018-12-18 DIAGNOSIS — Z12.31 VISIT FOR SCREENING MAMMOGRAM: ICD-10-CM

## 2018-12-18 PROCEDURE — 77067 SCR MAMMO BI INCL CAD: CPT

## 2019-07-22 PROBLEM — R92.2 DENSE BREAST TISSUE ON MAMMOGRAM: Status: ACTIVE | Noted: 2019-07-22

## 2019-11-19 ENCOUNTER — HOSPITAL ENCOUNTER (OUTPATIENT)
Dept: SURGERY | Age: 44
Discharge: HOME | End: 2019-11-19

## 2019-11-20 VITALS — WEIGHT: 214 LBS | HEIGHT: 62 IN | BODY MASS INDEX: 39.38 KG/M2

## 2019-11-20 RX ORDER — ACETAMINOPHEN 325 MG/1
TABLET ORAL
COMMUNITY

## 2019-11-25 ENCOUNTER — ANESTHESIA EVENT (OUTPATIENT)
Dept: SURGERY | Age: 44
End: 2019-11-25
Payer: COMMERCIAL

## 2019-11-25 ENCOUNTER — HOSPITAL ENCOUNTER (OUTPATIENT)
Dept: LAB | Age: 44
Discharge: HOME | End: 2019-11-25
Payer: COMMERCIAL

## 2019-11-25 LAB — HGB BLD-MCNC: 13.4 G/DL (ref 11.7–15.4)

## 2019-11-25 PROCEDURE — 85018 HEMOGLOBIN: CPT

## 2019-11-25 PROCEDURE — 36415 COLL VENOUS BLD VENIPUNCTURE: CPT

## 2019-11-25 RX ORDER — SODIUM CHLORIDE 0.9 % (FLUSH) 0.9 %
5-40 SYRINGE (ML) INJECTION AS NEEDED
Status: CANCELLED | OUTPATIENT
Start: 2019-11-25

## 2019-11-25 RX ORDER — SODIUM CHLORIDE 0.9 % (FLUSH) 0.9 %
5-40 SYRINGE (ML) INJECTION EVERY 8 HOURS
Status: CANCELLED | OUTPATIENT
Start: 2019-11-25

## 2019-11-25 NOTE — H&P
Subjective:     Rojelio Lipscomb, MRN: [id number], is a 38 y.o.  female presents with AUB and sxs c/w endometriosis. gradually worsening course. See office notes on care.     Patient Active Problem List    Diagnosis Date Noted    Dense breast tissue on mammogram 2019    Severe obesity (BMI 35.0-39.9) 2018    Encounter for insertion of ParaGard IUD 2017     delivery delivered 2017    AMA (advanced maternal age) multigravida 35+ 2016    H/O  section 2016     Past Medical History:   Diagnosis Date    Asthma     PRN inhaler--last used beginnning of 2019, followed by PCP     Endometrial polyp     H/O seasonal allergies     seasonal allergies    High cholesterol     Controlled with medication     Pilar cysts     Scalp    Pregnancy 2016    history       Past Surgical History:   Procedure Laterality Date    HX  SECTION      x 2    HX CYSTECTOMY      Pilar cyst on scalp    HX HEENT      sinus    HX OTHER SURGICAL      toe nails       [unfilled]  Allergies   Allergen Reactions    [place] Other (comments)     Blistered tongue    Flaxseed Rash and Hives    Oats Unknown (comments)     Allergy testing    Oxaprozin Rash and Itching    Penicillins Other (comments) and Rash     Patient states MD told her not to take because she has a severe mold allergy    Sesame Oil Unknown (comments)     Allergy testing    Yeast Rash    Aspirin Other (comments)     asthma      Noé Seed Rash     Leopard Print rash    Clindamycin Rash    Ibuprofen Other (comments)     asthma      Yeast, Dried Unknown (comments)     Allergy testing      Social History     Tobacco Use    Smoking status: Never Smoker    Smokeless tobacco: Never Used   Substance Use Topics    Alcohol use: Yes     Comment: occasional      Family History   Problem Relation Age of Onset    Heart Disease Mother     Other Mother         Fibromyalgia    Heart Disease Father     Colon Cancer Paternal Grandmother     No Known Problems Sister     No Known Problems Brother     No Known Problems Maternal Grandmother     No Known Problems Maternal Grandfather     No Known Problems Paternal Grandfather     Malignant Hyperthermia Neg Hx     Pseudocholinesterase Deficiency Neg Hx     Delayed Awakening Neg Hx     Post-op Nausea/Vomiting Neg Hx     Emergence Delirium Neg Hx     Post-op Cognitive Dysfunction Neg Hx     Breast Cancer Neg Hx         Prenatal Labs:   Lab Results   Component Value Date/Time    Rubella, External 4.77 07/13/2016    GrBStrep, External Positive (Clindamycin Resistant) 01/25/2017    HBsAg, External NEG 07/13/2016    HIV, External NR 07/13/2016    RPR, External NR 07/13/2016        Review of Systems  Constitutional: negative  Respiratory: negative  Cardiovascular: negative  Musculoskeletal:negative    Objective:     No data found. No intake or output data in the 24 hours ending 11/25/19 1802  There were no vitals taken for this visit. General appearance: alert, cooperative, no distress, appears stated age  Head: Normocephalic, without obvious abnormality, atraumatic  Back: symmetric, no curvature. ROM normal. No CVA tenderness. Lungs: clear to auscultation bilaterally  Heart: regular rate and rhythm, S1, S2 normal, no murmur, click, rub or gallop  Abdomen: soft, non-tender. Bowel sounds normal. No masses,  no organomegaly  Pelvic: External genitalia normal, Vagina normal without discharge, cervix  Extremities: extremities normal, atraumatic, no cyanosis or edema  Pulses: 2+ and symmetric  Skin: Skin color, texture, turgor normal. No rashes or lesions      Assessment:     Active Problems:    * No active hospital problems. *      AUB and endometriosis, pt for D and C hysteroscopy and Dx lap with laser of endo. . Discussed risks of infection, DVT, damage to bowel/bladder/other internal organs, bleeding/transfusion, scar tissue/adhesions.  All questions answered, will proceed.       Plan:     D and C, hysteroscopy and Dx lap under general anesthesia

## 2019-11-25 NOTE — PERIOP NOTES
Lab results within limits per anesthesia protocol; OK for surgery.      Recent Results (from the past 12 hour(s))   HEMOGLOBIN    Collection Time: 11/25/19  8:25 AM   Result Value Ref Range    HGB 13.4 11.7 - 15.4 g/dL

## 2019-11-26 ENCOUNTER — HOSPITAL ENCOUNTER (OUTPATIENT)
Age: 44
Discharge: HOME | End: 2019-11-26
Attending: OBSTETRICS & GYNECOLOGY | Admitting: OBSTETRICS & GYNECOLOGY
Payer: COMMERCIAL

## 2019-11-26 ENCOUNTER — ANESTHESIA (OUTPATIENT)
Dept: SURGERY | Age: 44
End: 2019-11-26
Payer: COMMERCIAL

## 2019-11-26 VITALS
TEMPERATURE: 98.7 F | OXYGEN SATURATION: 92 % | DIASTOLIC BLOOD PRESSURE: 72 MMHG | SYSTOLIC BLOOD PRESSURE: 118 MMHG | HEIGHT: 63 IN | HEART RATE: 81 BPM | BODY MASS INDEX: 39.16 KG/M2 | RESPIRATION RATE: 15 BRPM | WEIGHT: 221 LBS

## 2019-11-26 DIAGNOSIS — N80.9 ENDOMETRIOSIS: Primary | ICD-10-CM

## 2019-11-26 LAB — HCG UR QL: NEGATIVE

## 2019-11-26 PROCEDURE — 76010000161 HC OR TIME 1 TO 1.5 HR INTENSV-TIER 1: Performed by: OBSTETRICS & GYNECOLOGY

## 2019-11-26 PROCEDURE — 77030037088 HC TUBE ENDOTRACH ORAL NSL COVD-A: Performed by: ANESTHESIOLOGY

## 2019-11-26 PROCEDURE — 77030008522 HC TBNG INSUF LAPRO STRY -B: Performed by: OBSTETRICS & GYNECOLOGY

## 2019-11-26 PROCEDURE — 74011250636 HC RX REV CODE- 250/636: Performed by: OBSTETRICS & GYNECOLOGY

## 2019-11-26 PROCEDURE — 77030003578 HC NDL INSUF VERES AMR -B: Performed by: OBSTETRICS & GYNECOLOGY

## 2019-11-26 PROCEDURE — 76210000016 HC OR PH I REC 1 TO 1.5 HR: Performed by: OBSTETRICS & GYNECOLOGY

## 2019-11-26 PROCEDURE — 77030008606 HC TRCR ENDOSC KII AMR -B: Performed by: OBSTETRICS & GYNECOLOGY

## 2019-11-26 PROCEDURE — 74011250636 HC RX REV CODE- 250/636: Performed by: ANESTHESIOLOGY

## 2019-11-26 PROCEDURE — 77030040361 HC SLV COMPR DVT MDII -B: Performed by: OBSTETRICS & GYNECOLOGY

## 2019-11-26 PROCEDURE — 74011250636 HC RX REV CODE- 250/636: Performed by: NURSE ANESTHETIST, CERTIFIED REGISTERED

## 2019-11-26 PROCEDURE — 77030031139 HC SUT VCRL2 J&J -A: Performed by: OBSTETRICS & GYNECOLOGY

## 2019-11-26 PROCEDURE — 74011000250 HC RX REV CODE- 250: Performed by: OBSTETRICS & GYNECOLOGY

## 2019-11-26 PROCEDURE — 77030012770 HC TRCR OPT FX AMR -B: Performed by: OBSTETRICS & GYNECOLOGY

## 2019-11-26 PROCEDURE — 77030040830 HC CATH URETH FOL MDII -A: Performed by: OBSTETRICS & GYNECOLOGY

## 2019-11-26 PROCEDURE — 76060000034 HC ANESTHESIA 1.5 TO 2 HR: Performed by: OBSTETRICS & GYNECOLOGY

## 2019-11-26 PROCEDURE — 77030039425 HC BLD LARYNG TRULITE DISP TELE -A: Performed by: ANESTHESIOLOGY

## 2019-11-26 PROCEDURE — 77030018836 HC SOL IRR NACL ICUM -A: Performed by: OBSTETRICS & GYNECOLOGY

## 2019-11-26 PROCEDURE — 88305 TISSUE EXAM BY PATHOLOGIST: CPT

## 2019-11-26 PROCEDURE — 77030008756 HC TU IRR SUC STRY -B: Performed by: OBSTETRICS & GYNECOLOGY

## 2019-11-26 PROCEDURE — 74011000250 HC RX REV CODE- 250: Performed by: NURSE ANESTHETIST, CERTIFIED REGISTERED

## 2019-11-26 PROCEDURE — 74011000250 HC RX REV CODE- 250: Performed by: ANESTHESIOLOGY

## 2019-11-26 PROCEDURE — 76210000021 HC REC RM PH II 0.5 TO 1 HR: Performed by: OBSTETRICS & GYNECOLOGY

## 2019-11-26 PROCEDURE — 77030010509 HC AIRWY LMA MSK TELE -A: Performed by: ANESTHESIOLOGY

## 2019-11-26 PROCEDURE — 77030019927 HC TBNG IRR CYSTO BAXT -A: Performed by: OBSTETRICS & GYNECOLOGY

## 2019-11-26 PROCEDURE — 77030020829: Performed by: OBSTETRICS & GYNECOLOGY

## 2019-11-26 PROCEDURE — 74011250637 HC RX REV CODE- 250/637: Performed by: ANESTHESIOLOGY

## 2019-11-26 PROCEDURE — 77030011502 HC MANIP UTER ZUM ZINN -B: Performed by: OBSTETRICS & GYNECOLOGY

## 2019-11-26 PROCEDURE — 77030010507 HC ADH SKN DERMBND J&J -B: Performed by: OBSTETRICS & GYNECOLOGY

## 2019-11-26 PROCEDURE — 81025 URINE PREGNANCY TEST: CPT

## 2019-11-26 PROCEDURE — 77030000038 HC TIP SCIS LAPSCP SURI -B: Performed by: OBSTETRICS & GYNECOLOGY

## 2019-11-26 RX ORDER — GLYCOPYRROLATE 0.2 MG/ML
INJECTION INTRAMUSCULAR; INTRAVENOUS AS NEEDED
Status: DISCONTINUED | OUTPATIENT
Start: 2019-11-26 | End: 2019-11-26 | Stop reason: HOSPADM

## 2019-11-26 RX ORDER — DIPHENHYDRAMINE HYDROCHLORIDE 50 MG/ML
12.5 INJECTION, SOLUTION INTRAMUSCULAR; INTRAVENOUS
Status: DISCONTINUED | OUTPATIENT
Start: 2019-11-26 | End: 2019-11-26 | Stop reason: HOSPADM

## 2019-11-26 RX ORDER — BUPIVACAINE HYDROCHLORIDE 5 MG/ML
INJECTION, SOLUTION EPIDURAL; INTRACAUDAL AS NEEDED
Status: DISCONTINUED | OUTPATIENT
Start: 2019-11-26 | End: 2019-11-26 | Stop reason: HOSPADM

## 2019-11-26 RX ORDER — PROPOFOL 10 MG/ML
INJECTION, EMULSION INTRAVENOUS AS NEEDED
Status: DISCONTINUED | OUTPATIENT
Start: 2019-11-26 | End: 2019-11-26 | Stop reason: HOSPADM

## 2019-11-26 RX ORDER — HYDROMORPHONE HYDROCHLORIDE 2 MG/ML
0.5 INJECTION, SOLUTION INTRAMUSCULAR; INTRAVENOUS; SUBCUTANEOUS
Status: DISCONTINUED | OUTPATIENT
Start: 2019-11-26 | End: 2019-11-26 | Stop reason: HOSPADM

## 2019-11-26 RX ORDER — MIDAZOLAM HYDROCHLORIDE 1 MG/ML
2 INJECTION, SOLUTION INTRAMUSCULAR; INTRAVENOUS
Status: COMPLETED | OUTPATIENT
Start: 2019-11-26 | End: 2019-11-26

## 2019-11-26 RX ORDER — FENTANYL CITRATE 50 UG/ML
INJECTION, SOLUTION INTRAMUSCULAR; INTRAVENOUS AS NEEDED
Status: DISCONTINUED | OUTPATIENT
Start: 2019-11-26 | End: 2019-11-26 | Stop reason: HOSPADM

## 2019-11-26 RX ORDER — SODIUM CHLORIDE, SODIUM LACTATE, POTASSIUM CHLORIDE, CALCIUM CHLORIDE 600; 310; 30; 20 MG/100ML; MG/100ML; MG/100ML; MG/100ML
100 INJECTION, SOLUTION INTRAVENOUS CONTINUOUS
Status: DISCONTINUED | OUTPATIENT
Start: 2019-11-26 | End: 2019-11-26 | Stop reason: HOSPADM

## 2019-11-26 RX ORDER — HYDROCODONE BITARTRATE AND ACETAMINOPHEN 5; 325 MG/1; MG/1
1 TABLET ORAL
Qty: 15 TAB | Refills: 0 | Status: SHIPPED | OUTPATIENT
Start: 2019-11-26 | End: 2019-11-29

## 2019-11-26 RX ORDER — NEOSTIGMINE METHYLSULFATE 1 MG/ML
INJECTION, SOLUTION INTRAVENOUS AS NEEDED
Status: DISCONTINUED | OUTPATIENT
Start: 2019-11-26 | End: 2019-11-26 | Stop reason: HOSPADM

## 2019-11-26 RX ORDER — ONDANSETRON 2 MG/ML
INJECTION INTRAMUSCULAR; INTRAVENOUS AS NEEDED
Status: DISCONTINUED | OUTPATIENT
Start: 2019-11-26 | End: 2019-11-26 | Stop reason: HOSPADM

## 2019-11-26 RX ORDER — SODIUM CHLORIDE, SODIUM LACTATE, POTASSIUM CHLORIDE, CALCIUM CHLORIDE 600; 310; 30; 20 MG/100ML; MG/100ML; MG/100ML; MG/100ML
75 INJECTION, SOLUTION INTRAVENOUS CONTINUOUS
Status: DISCONTINUED | OUTPATIENT
Start: 2019-11-26 | End: 2019-11-26 | Stop reason: HOSPADM

## 2019-11-26 RX ORDER — LIDOCAINE HYDROCHLORIDE 20 MG/ML
INJECTION, SOLUTION EPIDURAL; INFILTRATION; INTRACAUDAL; PERINEURAL AS NEEDED
Status: DISCONTINUED | OUTPATIENT
Start: 2019-11-26 | End: 2019-11-26 | Stop reason: HOSPADM

## 2019-11-26 RX ORDER — ROCURONIUM BROMIDE 10 MG/ML
INJECTION, SOLUTION INTRAVENOUS AS NEEDED
Status: DISCONTINUED | OUTPATIENT
Start: 2019-11-26 | End: 2019-11-26 | Stop reason: HOSPADM

## 2019-11-26 RX ORDER — OXYCODONE HYDROCHLORIDE 5 MG/1
5 TABLET ORAL
Status: COMPLETED | OUTPATIENT
Start: 2019-11-26 | End: 2019-11-26

## 2019-11-26 RX ORDER — CEFAZOLIN SODIUM/WATER 2 G/20 ML
2 SYRINGE (ML) INTRAVENOUS ONCE
Status: COMPLETED | OUTPATIENT
Start: 2019-11-26 | End: 2019-11-26

## 2019-11-26 RX ORDER — ACETAMINOPHEN 10 MG/ML
1000 INJECTION, SOLUTION INTRAVENOUS ONCE
Status: COMPLETED | OUTPATIENT
Start: 2019-11-26 | End: 2019-11-26

## 2019-11-26 RX ORDER — LIDOCAINE HYDROCHLORIDE 10 MG/ML
0.1 INJECTION INFILTRATION; PERINEURAL AS NEEDED
Status: DISCONTINUED | OUTPATIENT
Start: 2019-11-26 | End: 2019-11-26 | Stop reason: HOSPADM

## 2019-11-26 RX ORDER — DEXAMETHASONE SODIUM PHOSPHATE 4 MG/ML
INJECTION, SOLUTION INTRA-ARTICULAR; INTRALESIONAL; INTRAMUSCULAR; INTRAVENOUS; SOFT TISSUE AS NEEDED
Status: DISCONTINUED | OUTPATIENT
Start: 2019-11-26 | End: 2019-11-26 | Stop reason: HOSPADM

## 2019-11-26 RX ORDER — FLUMAZENIL 0.1 MG/ML
0.2 INJECTION INTRAVENOUS
Status: DISCONTINUED | OUTPATIENT
Start: 2019-11-26 | End: 2019-11-26 | Stop reason: HOSPADM

## 2019-11-26 RX ORDER — NALOXONE HYDROCHLORIDE 0.4 MG/ML
0.1 INJECTION, SOLUTION INTRAMUSCULAR; INTRAVENOUS; SUBCUTANEOUS
Status: DISCONTINUED | OUTPATIENT
Start: 2019-11-26 | End: 2019-11-26 | Stop reason: HOSPADM

## 2019-11-26 RX ADMIN — Medication 2 G: at 07:46

## 2019-11-26 RX ADMIN — FENTANYL CITRATE 100 MCG: 50 INJECTION INTRAMUSCULAR; INTRAVENOUS at 07:51

## 2019-11-26 RX ADMIN — ACETAMINOPHEN 1000 MG: 10 INJECTION, SOLUTION INTRAVENOUS at 09:39

## 2019-11-26 RX ADMIN — DEXAMETHASONE SODIUM PHOSPHATE 4 MG: 4 INJECTION, SOLUTION INTRAMUSCULAR; INTRAVENOUS at 08:51

## 2019-11-26 RX ADMIN — SODIUM CHLORIDE, SODIUM LACTATE, POTASSIUM CHLORIDE, AND CALCIUM CHLORIDE: 600; 310; 30; 20 INJECTION, SOLUTION INTRAVENOUS at 08:50

## 2019-11-26 RX ADMIN — Medication 3 MG: at 08:54

## 2019-11-26 RX ADMIN — ONDANSETRON 4 MG: 2 INJECTION INTRAMUSCULAR; INTRAVENOUS at 08:51

## 2019-11-26 RX ADMIN — SODIUM CHLORIDE, SODIUM LACTATE, POTASSIUM CHLORIDE, AND CALCIUM CHLORIDE 100 ML/HR: 600; 310; 30; 20 INJECTION, SOLUTION INTRAVENOUS at 06:59

## 2019-11-26 RX ADMIN — LIDOCAINE HYDROCHLORIDE 0.1 ML: 10 INJECTION, SOLUTION INFILTRATION; PERINEURAL at 06:59

## 2019-11-26 RX ADMIN — ROCURONIUM BROMIDE 40 MG: 10 INJECTION, SOLUTION INTRAVENOUS at 07:51

## 2019-11-26 RX ADMIN — OXYCODONE HYDROCHLORIDE 5 MG: 5 TABLET ORAL at 10:34

## 2019-11-26 RX ADMIN — SODIUM CHLORIDE, SODIUM LACTATE, POTASSIUM CHLORIDE, AND CALCIUM CHLORIDE: 600; 310; 30; 20 INJECTION, SOLUTION INTRAVENOUS at 07:46

## 2019-11-26 RX ADMIN — PROPOFOL 200 MG: 10 INJECTION, EMULSION INTRAVENOUS at 07:51

## 2019-11-26 RX ADMIN — LIDOCAINE HYDROCHLORIDE 60 MG: 20 INJECTION, SOLUTION EPIDURAL; INFILTRATION; INTRACAUDAL; PERINEURAL at 07:51

## 2019-11-26 RX ADMIN — GLYCOPYRROLATE 0.4 MG: 0.2 INJECTION, SOLUTION INTRAMUSCULAR; INTRAVENOUS at 08:54

## 2019-11-26 RX ADMIN — MIDAZOLAM 2 MG: 1 INJECTION INTRAMUSCULAR; INTRAVENOUS at 07:12

## 2019-11-26 NOTE — ROUTINE PROCESS
present for family member and consult with Dr. Zain John after surgery. Mariela Gibson [facility]//  Patient Relations & Interpreting Services 
p: [phone] / [email or web address]".[facility]

## 2019-11-26 NOTE — BRIEF OP NOTE
BRIEF OPERATIVE NOTE    Date of Procedure: 11/26/2019   Preoperative Diagnosis: Pelvic pain in female [R10.2]  Postoperative Diagnosis: Pelvic pain in female [R10.2]    Procedure(s):  LAPAROSCOPY DIAGNOSTIC LYSIS OF ADHESIONS  DILATATION AND CURETTAGE HYSTEROSCOPY  Surgeon(s) and Role:     * Radha Rouse MD - Primary     * Queta Medellin MD - Assisting         Surgical Assistant: Dr Oliva Bella    Surgical Staff:  Circ-1: Carlos Mata RN  Scrub Tech-1: Frandy Valentino  Scrub Tech-2: Pepito RICHARDSON  Event Time In Time Out   Incision Start 2987    Incision Close 0900      Anesthesia: General   Estimated Blood Loss: 10cc  Specimens:   ID Type Source Tests Collected by Time Destination   1 : endometrial currettings Preservative   Radha Rouse MD 11/26/2019 0825 Pathology      Findings: endometriosis adhesions , enlarged uterus  Complications: none    Implants: * No implants in log *

## 2019-11-26 NOTE — DISCHARGE INSTRUCTIONS
INSTRUCTIONS FOLLOWING GYN LAPAROSCOPY    ACTIVITY   Limit activity today; increase activity tomorrow, but no vigorous exercise   Shower only; no tub baths   No douches, tampons or intercourse until your doctor releases you (at least 2 weeks)   May return to work or school as directed by your doctor    DIET   Clear liquids until no nausea or vomiting   Advance to regular diet as tolerated    PAIN   Expect a moderate amount of pain.  Take pain medication as directed by your doctor. If no prescription for pain is sent home with you, take the appropriate dose of your commonly used pain medication.  Call you doctor if pain is NOT relieved by medication.  DO NOT take aspirin or blood thinners until directed by your doctor. DRESSING CARE   Change dressing / band aids as directed by your doctor. FOLLOW PHONE [facility] [place] will be made by nursing staff.  If you have any problems or concerns, call your doctor as needed. CALL YOUR DOCTOR IF   Excessive bleeding that does not stop after holding mild pressure over the area for 15 minutes   You soak a pad in an hour   Temperature of 101°F or above   Green or yellow, smelly drainage or discharge   You are unable to urinate by bedtime   Nausea and vomiting that does not stop by bedtime    AFTER ANESTHESIA   For the next 24 hours: DO NOT Drive, Drink alcoholic beverages, or Make important decisions.  Be aware of dizziness following anesthesia and while taking pain medication.    Plan to stay tonight within 1 hours drive of the hospital.

## 2019-11-26 NOTE — ANESTHESIA POSTPROCEDURE EVALUATION
Procedure(s):  LAPAROSCOPY DIAGNOSTIC LYSIS OF ADHESIONS  DILATATION AND CURETTAGE HYSTEROSCOPY.     general    Anesthesia Post Evaluation      Multimodal analgesia: multimodal analgesia used between 6 hours prior to anesthesia start to PACU discharge  Patient location during evaluation: PACU  Patient participation: complete - patient participated  Level of consciousness: awake  Pain management: adequate  Airway patency: patent  Anesthetic complications: no  Cardiovascular status: acceptable  Respiratory status: acceptable, spontaneous ventilation and nonlabored ventilation  Hydration status: acceptable  Post anesthesia nausea and vomiting:  none      Vitals Value Taken Time   /66 11/26/2019  9:56 AM   Temp 37.1 °C (98.7 °F) 11/26/2019  9:11 AM   Pulse 66 11/26/2019  9:56 AM   Resp 15 11/26/2019  9:56 AM   SpO2 97 % 11/26/2019  9:56 AM

## 2019-11-26 NOTE — ANESTHESIA PREPROCEDURE EVALUATION
Relevant Problems   No relevant active problems       Anesthetic History   No history of anesthetic complications            Review of Systems / Medical History  Patient summary reviewed and pertinent labs reviewed    Pulmonary            Asthma : well controlled       Neuro/Psych   Within defined limits           Cardiovascular              Hyperlipidemia    Exercise tolerance: >4 METS  Comments: Denies recent CP, SOB or Palpitations   GI/Hepatic/Renal  Within defined limits              Endo/Other        Morbid obesity     Other Findings              Physical Exam    Airway  Mallampati: III  TM Distance: 4 - 6 cm  Neck ROM: normal range of motion   Mouth opening: Normal     Cardiovascular  Regular rate and rhythm,  S1 and S2 normal,  no murmur, click, rub, or gallop             Dental  No notable dental hx       Pulmonary  Breath sounds clear to auscultation               Abdominal  GI exam deferred       Other Findings            Anesthetic Plan    ASA: 3  Anesthesia type: general          Induction: Intravenous  Anesthetic plan and risks discussed with: Patient

## 2019-11-27 NOTE — OP NOTES
[place]  OPERATIVE REPORT    Name:  Sandra Morales  MR#:  [id number]  :  1975  ACCOUNT #:  [de-identified]  DATE OF SERVICE:  2019    PREOPERATIVE DIAGNOSES:  Pelvic pain and abnormal uterine bleeding. POSTOPERATIVE DIAGNOSES:  Pelvic pain and abnormal uterine bleeding with endometriosis and adenomyosis. PROCEDURE PERFORMED:  1. Dilatation and curettage. 2.  Hysteroscopy. 3.  Diagnostic laparoscopy. 4.  Lysis of pelvic adhesions. SURGEON:  Wai Hernandes MD    ASSISTANT:  Dr. Ashley Stauffer. ANESTHESIA:  General.    COMPLICATIONS:  None. SPECIMENS REMOVED:  Endometrial scrapings. IMPLANTS:  None. ESTIMATED BLOOD LOSS:  10 mL. FINDINGS:  Endometriosis, adhesions and enlarged uterus, normal right tube and ovary. Normal appendix. PROCEDURE:  After informed consent, the patient was taken to the operating room and given general anesthesia. She was prepped and draped in the usual sterile fashion in the dorsal lithotomy position. During the prepping, the Betadine sponge did wrap around the IUD string and this was removed. This was going to be removed anyway because we were going to do a D&C and hysteroscopy. This was kept sterile the entire time of the case. The weighted speculum was placed in the vagina and tenaculum was used to grasp the anterior lip of the cervix. Cervix was dilated to admit the hysteroscope. The hysteroscope was then introduced into the uterine cavity, this was totally normal.  There was no thickened tissue, there were no polyps or fibroids, and the contour of the cavity was normal.  Endometrial curettings were then obtained and submitted for pathologic study. The hysteroscope was re-introduced and there was no perforation. At this time, the [place] Lesser was then placed in the uterus for manipulation purposes, and the tenaculum and weighted speculum were removed.     Laparoscopy was begun by infiltrating dilute Marcaine into the infraumbilical area.  A #11 blade was used to make a 5 mm incision and through this, a Veress needle was passed into the abdominal cavity. Then, 3.5 liters of CO2 were infiltrated. Veress needle was withdrawn and the 5 mm disposable trocar was passed through this incision, the obturator was removed and the laparoscope passed through the sleeve. The patient was placed in Trendelenburg. The uterus was elevated. It was evident that there were a lot of pelvic adhesions which would need to be lysed, so at this time, a 5 mm port was placed in the left lower quadrant. Through this, initially a blunt probe was used to manipulate the uterus and tubes and ovaries. The examination of the upper abdomen was normal as well as liver and gallbladder. The right tube and ovary were found to be normal.  However, there were quite a lot of adhesions from the rectosigmoid to the left inferior fundal aspect of the uterus. There were numerous adhesions consistent with endometriosis, lot of blebs were present as well. There were three small subserosal fibroids that were noted as well. At this time, the endoscopic scissors were used to cut the adhesions, and these were hooked up to electrocautery. These adhesions were then stripped away. The blebs were then burned as well. All of the adhesions were either destroyed by heat or removed. The left fallopian tube was adhered to the inferior aspect of the uterus, but these adhesions were removed. This still had some disease process with some hydrosalpinx as well. The left ovary did have some filmy endometriosis lesions on it and these were ablated. Once all of the endometriosis had been treated, the uterus was elevated and the cul-de-sac was actually clean. The pictures were taken before and after. At this time, it was deemed that we had done all we could due to laparoscopic approach. Therefore, the procedure was terminated as the final inspection revealed good hemostasis.   All the instruments and the ports were removed under direct visualization. The Amisha Paducah was removed from the vagina. The ports were closed at the skin level with 4-0 Vicryl subcuticular stitch. Then, this was sealed over with Dermabond. Once the Amisha Mariela had been removed, tenaculum was used to grasp the cervix and the IUD was placed back into the fundus of the uterus. Counts were correct x2, and the patient was awakened and taken to recovery room in stable condition. Please note that Dr. Larry Zimmer presence was necessary to use the manipulator and raise the uterus while adhesiolysis was being accomplished with the scissors as well as manipulating the scope; therefore, his help was needed to perform this surgery adequately and safely.       Malachi Conde MD      [id number]  D:  11/26/2019 17:59  T:  11/27/2019 4:07  JOB #:  [id number]

## 2020-01-15 ENCOUNTER — HOSPITAL ENCOUNTER (OUTPATIENT)
Dept: MAMMOGRAPHY | Age: 45
Discharge: HOME | End: 2020-01-15
Attending: OBSTETRICS & GYNECOLOGY
Payer: COMMERCIAL

## 2020-01-15 DIAGNOSIS — R10.2 PELVIC PAIN IN FEMALE: ICD-10-CM

## 2020-01-15 PROCEDURE — 77067 SCR MAMMO BI INCL CAD: CPT

## 2020-05-28 ENCOUNTER — HOSPITAL ENCOUNTER (OUTPATIENT)
Dept: SURGERY | Age: 45
Discharge: HOME | End: 2020-05-28

## 2020-05-29 VITALS — WEIGHT: 218 LBS | BODY MASS INDEX: 38.62 KG/M2 | HEIGHT: 63 IN

## 2020-05-29 RX ORDER — MONTELUKAST SODIUM 10 MG/1
10 TABLET ORAL
COMMUNITY

## 2020-05-29 NOTE — PERIOP NOTES
Patient verified name and . Order for consent not found in EHR. Type 2 surgery, PAT phone assessment complete. Orders not received. Labs per surgeon: not  Labs per anesthesia protocol: Hemoglobin       Patient answered medical/surgical history questions at their best of ability. All prior to admission medications documented in [facility]. Patient instructed to take the following medications the day of surgery according to anesthesia guidelines with a small sip of water:Albuteral inhaler and bring to hospital .Hold all vitamins 7 days prior to surgery and NSAIDS 5 days prior to surgery. Prescription meds to hold:None    Patient instructed on the following:  >A negative Covid swab result is required to proceed with surgery; the swab will be collected 7 days prior to surgery at the [facility] at [address]. The patient will be contacted by the Covid swab team for an appointment date and time. For questions or concerns the patient should call [phone]. The clinic is closed from 12-1 for lunch and on weekends. Appointment date/time not found in EHR. Patient given the above number and instructions. > 1 visitor allowed at this time. >Arrive at [facility], time of arrival to be called the day before by 1700  >NPO after midnight including gum, mints, and ice chips  >Responsible adult must drive patient to the hospital, stay during surgery, and patient will need supervision 24 hours after anesthesia  >Use hibiclens in shower the night before surgery and on the morning of surgery  >All piercings must be removed prior to arrival.    >Leave all valuables (money and jewelry) at home but bring insurance card and ID on       DOS. >Do not wear make-up, nail polish, lotions, cologne, perfumes, powders, or oil on skin. Patient teach back successful and patient demonstrates knowledge of instruction.

## 2020-05-29 NOTE — PERIOP NOTES
Enhanced Recovery After GYN Surgery: non-diabetic patients    Drink Ensure Enlive - one bottle twice daily for five days starting on 06/03/2020 through 06/06/07/2020. Do not drink any Ensure Enlive the day before surgery 06/08/2020. Ensure Enlive is the preferred formula over other Ensure formulas as it is the only one that contains CaHMB which helps maintain and rebuild muscle health. It is recommended that you continue drinking this for one month after surgery. The night before surgery 06/08/2020, drink 2 bottles of the Ensure Pre-Surgery drink. The morning of surgery 06/09/2020, drink one bottle of the Ensure Pre-Surgery drink while on  your way to the hospital. Drink this over 5-10 minutes. Drink nothing else after drinking the pre-surgical drink the morning of surgery. Bring your patient handbook with you to the hospital.    Things to remember:    1. You will be given clear liquids to drink, advancing diet as tolerated    2. You will be up and moving around with assistance 2-4 hours after surgery. 3. You will be given regularly scheduled pain medications (NSAIDS, Tylenol, Gabapentin) with narcotics as needed. 4. You may be able to go home that night if the surgeon okays and you are up and eating and drinking. Otherwise, your discharge will be the following morning around lunch time. 5. Continue drinking Ensure Enlive for 5 days after surgery.      This information will be discussed and a print out will be given to patient at GYN class

## 2020-05-29 NOTE — PERIOP NOTES
PLEASE CONTINUE TAKING ALL PRESCRIPTION MEDICATIONS UP TO THE DAY OF SURGERY UNLESS OTHERWISE DIRECTED BELOW. DISCONTINUE all vitamins and supplements 7 days prior to surgery. DISCONTINUE Non-Steriodal Anti-Inflammatory (NSAIDS) such as Advil and Aleve 5 days prior to surgery. Home Medications to take  the day of surgery (06/09/2020)   Use albuterol inhaler day of surgery and bring to hospital day of surgery           Home Medications   to Hold   Vitamins and supplements x 7 days prior to surgery        Comments          *Visitor policy of 1 visitor per patient discussed. Please do not bring home medications with you on the day of surgery unless otherwise directed by your nurse. If you are instructed to bring home medications, please give them to your nurse as they will be administered by the nursing staff. If you have any questions, please call [facility] [phone] or  [phone]. A copy of this note was provided to the patient for reference.

## 2020-06-03 ENCOUNTER — HOSPITAL ENCOUNTER (OUTPATIENT)
Dept: SURGERY | Age: 45
Discharge: HOME | End: 2020-06-03
Payer: COMMERCIAL

## 2020-06-03 LAB — HGB BLD-MCNC: 11.9 G/DL (ref 11.7–15.4)

## 2020-06-03 PROCEDURE — 85018 HEMOGLOBIN: CPT

## 2020-06-03 NOTE — PROGRESS NOTES
Patient attended [facility]/ GYN surgery orientation appointment today. Detailed instruction book regarding GYN surgery was provided at start of class. Class content included pre-operative instructions for surgery in the week prior to and day before surgery. Packet including Hibiclens and printed instructions on bathing was provided to patient. Detailed and printed diet instruction and presurgical drinks were also given to patient  in accordance with [facility] protocol. Detailed information was given regarding arriving at the hospital and instructions for the patient's day of surgery. Discussed recovery from surgery, hospital stay, pain management, and discharge. Reviewed recovery at home including pelvic rest, driving and activity restrictions, issues requiring call to physician etc. Brii Maharaje all questions in detail. Patient voices understanding of all.

## 2020-06-03 NOTE — PERIOP NOTES
Call from Sergo Rivas in lab stating lab specimen was clotted and needs re-collected. Pt notified and agreeable to return.

## 2020-06-04 NOTE — PERIOP NOTES
HGB done 6/3/20 WNL    Recent Results (from the past 24 hour(s))   HEMOGLOBIN    Collection Time: 06/03/20  3:29 PM   Result Value Ref Range    HGB 11.9 11.7 - 15.4 g/dL

## 2020-06-08 ENCOUNTER — ANESTHESIA EVENT (OUTPATIENT)
Dept: SURGERY | Age: 45
End: 2020-06-08
Payer: COMMERCIAL

## 2020-06-08 NOTE — H&P
Subjective:     Amanda Giron, MRN: [id number], is a 54 y.o.  female presents with chronic pelvic pain. gradually worsening course. See office notes on care.     Patient Active Problem List    Diagnosis Date Noted    Endometriosis 2019    Dense breast tissue on mammogram 2019    Severe obesity (BMI 35.0-39.9) 2018    Encounter for insertion of ParaGard IUD 2017     delivery delivered 2017    AMA (advanced maternal age) multigravida 35+ 2016    H/O  section 2016     Past Medical History:   Diagnosis Date    Asthma     PRN inhaler--last used beginnning of 2019, followed by PCP     Endometrial polyp     H/O seasonal allergies     seasonal allergies    High cholesterol     Controlled with medication     Pilar cysts     Scalp    Pregnancy 2016    history       Past Surgical History:   Procedure Laterality Date    HX  SECTION      x 2    HX CYSTECTOMY      Pilar cyst on scalp    HX GYN      hysteroscopy and D&C    HX HEENT      sinus    HX OTHER SURGICAL      toe nails       [unfilled]  Allergies   Allergen Reactions    [place] Other (comments)     Blistered tongue    Flaxseed Rash and Hives    Oats Unknown (comments)     Allergy testing    Oxaprozin Rash and Itching     Daypro    Penicillins Other (comments) and Rash     Patient states MD told her not to take because she has a severe mold allergy    Sesame Oil Unknown (comments)     Allergy testing    Yeast Rash    Aspirin Other (comments)     asthma      Noé Seed Rash     Leopard Print rash    Clindamycin Rash    Ibuprofen Other (comments)     asthma      Yeast, Dried Unknown (comments)     Allergy testing      Social History     Tobacco Use    Smoking status: Never Smoker    Smokeless tobacco: Never Used   Substance Use Topics    Alcohol use: Yes     Comment: occasional      Family History   Problem Relation Age of Onset    Heart Disease Mother     Other Mother         Fibromyalgia    Heart Disease Father     Colon Cancer Paternal Grandmother     No Known Problems Sister     No Known Problems Brother     No Known Problems Maternal Grandmother     No Known Problems Maternal Grandfather     No Known Problems Paternal Grandfather     Malignant Hyperthermia Neg Hx     Pseudocholinesterase Deficiency Neg Hx     Delayed Awakening Neg Hx     Post-op Nausea/Vomiting Neg Hx     Emergence Delirium Neg Hx     Post-op Cognitive Dysfunction Neg Hx     Breast Cancer Neg Hx         Prenatal Labs:   Lab Results   Component Value Date/Time    Rubella, External 4.77 07/13/2016    GrBStrep, External Positive (Clindamycin Resistant) 01/25/2017    HBsAg, External NEG 07/13/2016    HIV, External NR 07/13/2016    RPR, External NR 07/13/2016        Review of Systems  Constitutional: negative  Respiratory: negative  Cardiovascular: negative  Musculoskeletal:negative    Objective:     No data found. No intake or output data in the 24 hours ending 06/08/20 0959  Visit Vitals  LMP 05/22/2020     General appearance: alert, cooperative, no distress, appears stated age  Head: Normocephalic, without obvious abnormality, atraumatic  Back: symmetric, no curvature. ROM normal. No CVA tenderness. Lungs: clear to auscultation bilaterally  Heart: regular rate and rhythm, S1, S2 normal, no murmur, click, rub or gallop  Abdomen: soft, non-tender.  Bowel sounds normal. No masses,  no organomegaly, abnormal findings:  tenderness moderate in the lower abdomen  Pelvic: External genitalia normal, Vagina normal without discharge, positive findings: uterine enlargement, cervix nl  Extremities: extremities normal, atraumatic, no cyanosis or edema  Pulses: 2+ and symmetric  Skin: Skin color, texture, turgor normal. No rashes or lesions      Assessment:     Chronic pelvic pain, S/P  D and C and laparoscopic lysis of adhesions with no relief     Discussed risks of infection, DVT, damage to bowel/bladder/other internal organs, bleeding/transfusion, scar tissue/adhesions. All questions answered, will proceed with hysterectomy. No more children. Will try to retain right ovary.       Plan:     TLH, RS, LSO under general anesthesia

## 2020-06-09 ENCOUNTER — ANESTHESIA (OUTPATIENT)
Dept: SURGERY | Age: 45
End: 2020-06-09
Payer: COMMERCIAL

## 2020-06-09 ENCOUNTER — HOSPITAL ENCOUNTER (OUTPATIENT)
Age: 45
Discharge: HOME | End: 2020-06-10
Attending: OBSTETRICS & GYNECOLOGY | Admitting: OBSTETRICS & GYNECOLOGY
Payer: COMMERCIAL

## 2020-06-09 DIAGNOSIS — N80.9 ENDOMETRIOSIS: Primary | ICD-10-CM

## 2020-06-09 PROBLEM — Z90.710 S/P LAPAROSCOPIC HYSTERECTOMY: Status: ACTIVE | Noted: 2020-06-09

## 2020-06-09 PROBLEM — R10.2 PELVIC PAIN: Status: ACTIVE | Noted: 2020-06-09

## 2020-06-09 LAB
ABO + RH BLD: NORMAL
BLOOD GROUP ANTIBODIES SERPL: NORMAL
HCG UR QL: NEGATIVE
SPECIMEN EXP DATE BLD: NORMAL

## 2020-06-09 PROCEDURE — 76010000172 HC OR TIME 2.5 TO 3 HR INTENSV-TIER 1: Performed by: OBSTETRICS & GYNECOLOGY

## 2020-06-09 PROCEDURE — 77030031139 HC SUT VCRL2 J&J -A: Performed by: OBSTETRICS & GYNECOLOGY

## 2020-06-09 PROCEDURE — 77030002933 HC SUT MCRYL J&J -A: Performed by: OBSTETRICS & GYNECOLOGY

## 2020-06-09 PROCEDURE — 76060000036 HC ANESTHESIA 2.5 TO 3 HR: Performed by: OBSTETRICS & GYNECOLOGY

## 2020-06-09 PROCEDURE — 86900 BLOOD TYPING SEROLOGIC ABO: CPT

## 2020-06-09 PROCEDURE — 77030018778 HC MANIP UTER VCAR CNMD -B: Performed by: OBSTETRICS & GYNECOLOGY

## 2020-06-09 PROCEDURE — 74011000250 HC RX REV CODE- 250: Performed by: REGISTERED NURSE

## 2020-06-09 PROCEDURE — 77030018836 HC SOL IRR NACL ICUM -A: Performed by: OBSTETRICS & GYNECOLOGY

## 2020-06-09 PROCEDURE — 74011250636 HC RX REV CODE- 250/636: Performed by: ANESTHESIOLOGY

## 2020-06-09 PROCEDURE — 77030008606 HC TRCR ENDOSC KII AMR -B: Performed by: OBSTETRICS & GYNECOLOGY

## 2020-06-09 PROCEDURE — 77030039425 HC BLD LARYNG TRULITE DISP TELE -A: Performed by: ANESTHESIOLOGY

## 2020-06-09 PROCEDURE — 76210000006 HC OR PH I REC 0.5 TO 1 HR: Performed by: OBSTETRICS & GYNECOLOGY

## 2020-06-09 PROCEDURE — 74011250637 HC RX REV CODE- 250/637: Performed by: ANESTHESIOLOGY

## 2020-06-09 PROCEDURE — 77030037285 HC MANIP UTER DELINTR ADVINCULA DISP COOP -C: Performed by: OBSTETRICS & GYNECOLOGY

## 2020-06-09 PROCEDURE — 74011250636 HC RX REV CODE- 250/636: Performed by: REGISTERED NURSE

## 2020-06-09 PROCEDURE — 77030040361 HC SLV COMPR DVT MDII -B: Performed by: OBSTETRICS & GYNECOLOGY

## 2020-06-09 PROCEDURE — 77030037088 HC TUBE ENDOTRACH ORAL NSL COVD-A: Performed by: ANESTHESIOLOGY

## 2020-06-09 PROCEDURE — 77030003578 HC NDL INSUF VERES AMR -B: Performed by: OBSTETRICS & GYNECOLOGY

## 2020-06-09 PROCEDURE — 77030031492 HC PRT ACC BLNT AIRSEAL CNMD -B: Performed by: OBSTETRICS & GYNECOLOGY

## 2020-06-09 PROCEDURE — 77030040922 HC BLNKT HYPOTHRM STRY -A: Performed by: ANESTHESIOLOGY

## 2020-06-09 PROCEDURE — 74011250636 HC RX REV CODE- 250/636: Performed by: OBSTETRICS & GYNECOLOGY

## 2020-06-09 PROCEDURE — 77030019908 HC STETH ESOPH SIMS -A: Performed by: ANESTHESIOLOGY

## 2020-06-09 PROCEDURE — 77030010507 HC ADH SKN DERMBND J&J -B: Performed by: OBSTETRICS & GYNECOLOGY

## 2020-06-09 PROCEDURE — 77030020829: Performed by: OBSTETRICS & GYNECOLOGY

## 2020-06-09 PROCEDURE — 77030000038 HC TIP SCIS LAPSCP SURI -B: Performed by: OBSTETRICS & GYNECOLOGY

## 2020-06-09 PROCEDURE — 74011000250 HC RX REV CODE- 250: Performed by: OBSTETRICS & GYNECOLOGY

## 2020-06-09 PROCEDURE — 81025 URINE PREGNANCY TEST: CPT

## 2020-06-09 PROCEDURE — 77030008756 HC TU IRR SUC STRY -B: Performed by: OBSTETRICS & GYNECOLOGY

## 2020-06-09 PROCEDURE — 77030038160 HC SHR COAG HARM J&J -F: Performed by: OBSTETRICS & GYNECOLOGY

## 2020-06-09 PROCEDURE — 88307 TISSUE EXAM BY PATHOLOGIST: CPT

## 2020-06-09 PROCEDURE — 77030040830 HC CATH URETH FOL MDII -A: Performed by: OBSTETRICS & GYNECOLOGY

## 2020-06-09 PROCEDURE — 77030008522 HC TBNG INSUF LAPRO STRY -B: Performed by: OBSTETRICS & GYNECOLOGY

## 2020-06-09 PROCEDURE — 77030012770 HC TRCR OPT FX AMR -B: Performed by: OBSTETRICS & GYNECOLOGY

## 2020-06-09 PROCEDURE — 77030008771 HC TU NG SALEM SUMP -A: Performed by: ANESTHESIOLOGY

## 2020-06-09 PROCEDURE — 74011250637 HC RX REV CODE- 250/637: Performed by: OBSTETRICS & GYNECOLOGY

## 2020-06-09 RX ORDER — PROPOFOL 10 MG/ML
INJECTION, EMULSION INTRAVENOUS AS NEEDED
Status: DISCONTINUED | OUTPATIENT
Start: 2020-06-09 | End: 2020-06-09 | Stop reason: HOSPADM

## 2020-06-09 RX ORDER — FENTANYL CITRATE 50 UG/ML
INJECTION, SOLUTION INTRAMUSCULAR; INTRAVENOUS AS NEEDED
Status: DISCONTINUED | OUTPATIENT
Start: 2020-06-09 | End: 2020-06-09 | Stop reason: HOSPADM

## 2020-06-09 RX ORDER — OXYCODONE AND ACETAMINOPHEN 5; 325 MG/1; MG/1
1 TABLET ORAL AS NEEDED
Status: DISCONTINUED | OUTPATIENT
Start: 2020-06-09 | End: 2020-06-09

## 2020-06-09 RX ORDER — ACETAMINOPHEN 500 MG
1000 TABLET ORAL ONCE
Status: COMPLETED | OUTPATIENT
Start: 2020-06-09 | End: 2020-06-09

## 2020-06-09 RX ORDER — GLYCOPYRROLATE 0.2 MG/ML
INJECTION INTRAMUSCULAR; INTRAVENOUS AS NEEDED
Status: DISCONTINUED | OUTPATIENT
Start: 2020-06-09 | End: 2020-06-09 | Stop reason: HOSPADM

## 2020-06-09 RX ORDER — SCOLOPAMINE TRANSDERMAL SYSTEM 1 MG/1
1 PATCH, EXTENDED RELEASE TRANSDERMAL ONCE
Status: DISCONTINUED | OUTPATIENT
Start: 2020-06-09 | End: 2020-06-10 | Stop reason: HOSPADM

## 2020-06-09 RX ORDER — SODIUM CHLORIDE 0.9 % (FLUSH) 0.9 %
5-40 SYRINGE (ML) INJECTION AS NEEDED
Status: DISCONTINUED | OUTPATIENT
Start: 2020-06-09 | End: 2020-06-09 | Stop reason: HOSPADM

## 2020-06-09 RX ORDER — DEXAMETHASONE SODIUM PHOSPHATE 4 MG/ML
INJECTION, SOLUTION INTRA-ARTICULAR; INTRALESIONAL; INTRAMUSCULAR; INTRAVENOUS; SOFT TISSUE AS NEEDED
Status: DISCONTINUED | OUTPATIENT
Start: 2020-06-09 | End: 2020-06-09 | Stop reason: HOSPADM

## 2020-06-09 RX ORDER — SODIUM CHLORIDE, SODIUM LACTATE, POTASSIUM CHLORIDE, CALCIUM CHLORIDE 600; 310; 30; 20 MG/100ML; MG/100ML; MG/100ML; MG/100ML
75 INJECTION, SOLUTION INTRAVENOUS CONTINUOUS
Status: DISCONTINUED | OUTPATIENT
Start: 2020-06-09 | End: 2020-06-09

## 2020-06-09 RX ORDER — NEOSTIGMINE METHYLSULFATE 1 MG/ML
INJECTION, SOLUTION INTRAVENOUS AS NEEDED
Status: DISCONTINUED | OUTPATIENT
Start: 2020-06-09 | End: 2020-06-09 | Stop reason: HOSPADM

## 2020-06-09 RX ORDER — SIMETHICONE 80 MG
80 TABLET,CHEWABLE ORAL
Status: DISCONTINUED | OUTPATIENT
Start: 2020-06-09 | End: 2020-06-10 | Stop reason: HOSPADM

## 2020-06-09 RX ORDER — BUPIVACAINE HYDROCHLORIDE 5 MG/ML
INJECTION, SOLUTION EPIDURAL; INTRACAUDAL AS NEEDED
Status: DISCONTINUED | OUTPATIENT
Start: 2020-06-09 | End: 2020-06-09 | Stop reason: HOSPADM

## 2020-06-09 RX ORDER — KETAMINE HYDROCHLORIDE 50 MG/ML
INJECTION, SOLUTION INTRAMUSCULAR; INTRAVENOUS AS NEEDED
Status: DISCONTINUED | OUTPATIENT
Start: 2020-06-09 | End: 2020-06-09 | Stop reason: HOSPADM

## 2020-06-09 RX ORDER — NALOXONE HYDROCHLORIDE 0.4 MG/ML
0.2 INJECTION, SOLUTION INTRAMUSCULAR; INTRAVENOUS; SUBCUTANEOUS AS NEEDED
Status: DISCONTINUED | OUTPATIENT
Start: 2020-06-09 | End: 2020-06-09

## 2020-06-09 RX ORDER — HYDROMORPHONE HYDROCHLORIDE 2 MG/ML
0.5 INJECTION, SOLUTION INTRAMUSCULAR; INTRAVENOUS; SUBCUTANEOUS
Status: DISCONTINUED | OUTPATIENT
Start: 2020-06-09 | End: 2020-06-09

## 2020-06-09 RX ORDER — SODIUM CHLORIDE 0.9 % (FLUSH) 0.9 %
5-40 SYRINGE (ML) INJECTION EVERY 8 HOURS
Status: DISCONTINUED | OUTPATIENT
Start: 2020-06-09 | End: 2020-06-10 | Stop reason: HOSPADM

## 2020-06-09 RX ORDER — ZOLPIDEM TARTRATE 5 MG/1
5 TABLET ORAL
Status: DISCONTINUED | OUTPATIENT
Start: 2020-06-09 | End: 2020-06-10 | Stop reason: HOSPADM

## 2020-06-09 RX ORDER — LIDOCAINE HYDROCHLORIDE 10 MG/ML
0.1 INJECTION INFILTRATION; PERINEURAL AS NEEDED
Status: DISCONTINUED | OUTPATIENT
Start: 2020-06-09 | End: 2020-06-09 | Stop reason: HOSPADM

## 2020-06-09 RX ORDER — OXYCODONE HYDROCHLORIDE 5 MG/1
10 TABLET ORAL
Status: DISCONTINUED | OUTPATIENT
Start: 2020-06-09 | End: 2020-06-10 | Stop reason: HOSPADM

## 2020-06-09 RX ORDER — SODIUM CHLORIDE 0.9 % (FLUSH) 0.9 %
5-40 SYRINGE (ML) INJECTION EVERY 8 HOURS
Status: DISCONTINUED | OUTPATIENT
Start: 2020-06-09 | End: 2020-06-09 | Stop reason: HOSPADM

## 2020-06-09 RX ORDER — SODIUM CHLORIDE 0.9 % (FLUSH) 0.9 %
5-40 SYRINGE (ML) INJECTION AS NEEDED
Status: DISCONTINUED | OUTPATIENT
Start: 2020-06-09 | End: 2020-06-09

## 2020-06-09 RX ORDER — MIDAZOLAM HYDROCHLORIDE 1 MG/ML
2 INJECTION, SOLUTION INTRAMUSCULAR; INTRAVENOUS
Status: COMPLETED | OUTPATIENT
Start: 2020-06-09 | End: 2020-06-09

## 2020-06-09 RX ORDER — GABAPENTIN 300 MG/1
600 CAPSULE ORAL ONCE
Status: COMPLETED | OUTPATIENT
Start: 2020-06-09 | End: 2020-06-09

## 2020-06-09 RX ORDER — SODIUM CHLORIDE 0.9 % (FLUSH) 0.9 %
5-40 SYRINGE (ML) INJECTION AS NEEDED
Status: DISCONTINUED | OUTPATIENT
Start: 2020-06-09 | End: 2020-06-10 | Stop reason: HOSPADM

## 2020-06-09 RX ORDER — PROMETHAZINE HYDROCHLORIDE 25 MG/1
25 TABLET ORAL
Status: DISCONTINUED | OUTPATIENT
Start: 2020-06-09 | End: 2020-06-10 | Stop reason: HOSPADM

## 2020-06-09 RX ORDER — DIPHENHYDRAMINE HCL 25 MG
25 CAPSULE ORAL
Status: DISCONTINUED | OUTPATIENT
Start: 2020-06-09 | End: 2020-06-10 | Stop reason: HOSPADM

## 2020-06-09 RX ORDER — DOCUSATE SODIUM 100 MG/1
100 CAPSULE, LIQUID FILLED ORAL 2 TIMES DAILY
Status: DISCONTINUED | OUTPATIENT
Start: 2020-06-09 | End: 2020-06-10 | Stop reason: HOSPADM

## 2020-06-09 RX ORDER — ROCURONIUM BROMIDE 10 MG/ML
INJECTION, SOLUTION INTRAVENOUS AS NEEDED
Status: DISCONTINUED | OUTPATIENT
Start: 2020-06-09 | End: 2020-06-09 | Stop reason: HOSPADM

## 2020-06-09 RX ORDER — ONDANSETRON 2 MG/ML
INJECTION INTRAMUSCULAR; INTRAVENOUS AS NEEDED
Status: DISCONTINUED | OUTPATIENT
Start: 2020-06-09 | End: 2020-06-09 | Stop reason: HOSPADM

## 2020-06-09 RX ORDER — CEFAZOLIN SODIUM/WATER 2 G/20 ML
2 SYRINGE (ML) INTRAVENOUS ONCE
Status: COMPLETED | OUTPATIENT
Start: 2020-06-09 | End: 2020-06-09

## 2020-06-09 RX ORDER — LIDOCAINE HYDROCHLORIDE 20 MG/ML
INJECTION, SOLUTION EPIDURAL; INFILTRATION; INTRACAUDAL; PERINEURAL AS NEEDED
Status: DISCONTINUED | OUTPATIENT
Start: 2020-06-09 | End: 2020-06-09 | Stop reason: HOSPADM

## 2020-06-09 RX ORDER — SODIUM CHLORIDE, SODIUM LACTATE, POTASSIUM CHLORIDE, CALCIUM CHLORIDE 600; 310; 30; 20 MG/100ML; MG/100ML; MG/100ML; MG/100ML
25 INJECTION, SOLUTION INTRAVENOUS CONTINUOUS
Status: DISCONTINUED | OUTPATIENT
Start: 2020-06-09 | End: 2020-06-09 | Stop reason: HOSPADM

## 2020-06-09 RX ORDER — ACETAMINOPHEN 325 MG/1
650 TABLET ORAL
Status: DISCONTINUED | OUTPATIENT
Start: 2020-06-09 | End: 2020-06-10 | Stop reason: HOSPADM

## 2020-06-09 RX ORDER — NALOXONE HYDROCHLORIDE 0.4 MG/ML
0.4 INJECTION, SOLUTION INTRAMUSCULAR; INTRAVENOUS; SUBCUTANEOUS AS NEEDED
Status: DISCONTINUED | OUTPATIENT
Start: 2020-06-09 | End: 2020-06-10 | Stop reason: HOSPADM

## 2020-06-09 RX ORDER — EPHEDRINE SULFATE/0.9% NACL/PF 50 MG/5 ML
SYRINGE (ML) INTRAVENOUS AS NEEDED
Status: DISCONTINUED | OUTPATIENT
Start: 2020-06-09 | End: 2020-06-09 | Stop reason: HOSPADM

## 2020-06-09 RX ADMIN — GLYCOPYRROLATE 0.6 MG: 0.2 INJECTION, SOLUTION INTRAMUSCULAR; INTRAVENOUS at 09:37

## 2020-06-09 RX ADMIN — FENTANYL CITRATE 50 MCG: 50 INJECTION INTRAMUSCULAR; INTRAVENOUS at 07:25

## 2020-06-09 RX ADMIN — ACETAMINOPHEN 650 MG: 325 TABLET, FILM COATED ORAL at 23:51

## 2020-06-09 RX ADMIN — Medication 10 MG: at 08:22

## 2020-06-09 RX ADMIN — ROCURONIUM BROMIDE 5 MG: 10 INJECTION, SOLUTION INTRAVENOUS at 08:03

## 2020-06-09 RX ADMIN — ROCURONIUM BROMIDE 5 MG: 10 INJECTION, SOLUTION INTRAVENOUS at 08:30

## 2020-06-09 RX ADMIN — SIMETHICONE 80 MG: 80 TABLET, CHEWABLE ORAL at 16:34

## 2020-06-09 RX ADMIN — OXYCODONE 5 MG: 5 TABLET ORAL at 12:05

## 2020-06-09 RX ADMIN — KETAMINE HYDROCHLORIDE 50 MG: 50 INJECTION INTRAMUSCULAR; INTRAVENOUS at 07:25

## 2020-06-09 RX ADMIN — PROPOFOL 200 MG: 10 INJECTION, EMULSION INTRAVENOUS at 07:25

## 2020-06-09 RX ADMIN — LIDOCAINE HYDROCHLORIDE 80 MG: 20 INJECTION, SOLUTION EPIDURAL; INFILTRATION; INTRACAUDAL; PERINEURAL at 07:25

## 2020-06-09 RX ADMIN — SIMETHICONE 80 MG: 80 TABLET, CHEWABLE ORAL at 21:38

## 2020-06-09 RX ADMIN — DEXAMETHASONE SODIUM PHOSPHATE 10 MG: 4 INJECTION, SOLUTION INTRAMUSCULAR; INTRAVENOUS at 07:58

## 2020-06-09 RX ADMIN — GLYCOPYRROLATE 0.2 MG: 0.2 INJECTION, SOLUTION INTRAMUSCULAR; INTRAVENOUS at 09:42

## 2020-06-09 RX ADMIN — DOCUSATE SODIUM 100 MG: 100 CAPSULE, LIQUID FILLED ORAL at 14:54

## 2020-06-09 RX ADMIN — PHENYLEPHRINE HYDROCHLORIDE 100 MCG: 10 INJECTION INTRAVENOUS at 08:22

## 2020-06-09 RX ADMIN — MIDAZOLAM 2 MG: 1 INJECTION INTRAMUSCULAR; INTRAVENOUS at 06:57

## 2020-06-09 RX ADMIN — DOCUSATE SODIUM 100 MG: 100 CAPSULE, LIQUID FILLED ORAL at 20:26

## 2020-06-09 RX ADMIN — ROCURONIUM BROMIDE 40 MG: 10 INJECTION, SOLUTION INTRAVENOUS at 07:25

## 2020-06-09 RX ADMIN — FENTANYL CITRATE 50 MCG: 50 INJECTION INTRAMUSCULAR; INTRAVENOUS at 09:07

## 2020-06-09 RX ADMIN — OXYCODONE 5 MG: 5 TABLET ORAL at 11:34

## 2020-06-09 RX ADMIN — OXYCODONE 5 MG: 5 TABLET ORAL at 20:26

## 2020-06-09 RX ADMIN — ONDANSETRON 4 MG: 2 INJECTION INTRAMUSCULAR; INTRAVENOUS at 09:37

## 2020-06-09 RX ADMIN — ROCURONIUM BROMIDE 5 MG: 10 INJECTION, SOLUTION INTRAVENOUS at 08:44

## 2020-06-09 RX ADMIN — ROCURONIUM BROMIDE 5 MG: 10 INJECTION, SOLUTION INTRAVENOUS at 09:16

## 2020-06-09 RX ADMIN — ACETAMINOPHEN 650 MG: 325 TABLET, FILM COATED ORAL at 14:53

## 2020-06-09 RX ADMIN — Medication 4 MG: at 09:37

## 2020-06-09 RX ADMIN — SODIUM CHLORIDE, SODIUM LACTATE, POTASSIUM CHLORIDE, AND CALCIUM CHLORIDE 25 ML/HR: 600; 310; 30; 20 INJECTION, SOLUTION INTRAVENOUS at 06:35

## 2020-06-09 RX ADMIN — KETAMINE HYDROCHLORIDE 25 MG: 50 INJECTION INTRAMUSCULAR; INTRAVENOUS at 08:25

## 2020-06-09 RX ADMIN — ROCURONIUM BROMIDE 5 MG: 10 INJECTION, SOLUTION INTRAVENOUS at 07:55

## 2020-06-09 RX ADMIN — ACETAMINOPHEN 650 MG: 325 TABLET, FILM COATED ORAL at 19:24

## 2020-06-09 RX ADMIN — Medication 2 G: at 07:40

## 2020-06-09 RX ADMIN — GABAPENTIN 600 MG: 300 CAPSULE ORAL at 06:21

## 2020-06-09 RX ADMIN — OXYCODONE 10 MG: 5 TABLET ORAL at 16:34

## 2020-06-09 RX ADMIN — Medication 1 MG: at 09:42

## 2020-06-09 RX ADMIN — ACETAMINOPHEN 1000 MG: 500 TABLET, FILM COATED ORAL at 06:21

## 2020-06-09 NOTE — PROGRESS NOTES
Chart reviewed - hysterectomy. No needs identified. SW will continue to follow.     CORKY Rosenberg-RICHAR  [address] [facility]   [phone]

## 2020-06-09 NOTE — PROGRESS NOTES
IV hepwelled, moffett catheter removed, patient tolerated well. Patient sitting up eating dinner now, no further needs. 87

## 2020-06-09 NOTE — ANESTHESIA POSTPROCEDURE EVALUATION
Procedure(s): HYSTERECTOMY TOTAL LAPAROSCOPIC WITH BILATERAL SALPINGECTOM/ LEFT OOPHORECTOMY-REMOVAL OF IUD.     general    Anesthesia Post Evaluation      Multimodal analgesia: multimodal analgesia used between 6 hours prior to anesthesia start to PACU discharge  Patient location during evaluation: PACU  Patient participation: complete - patient participated  Level of consciousness: awake and alert  Pain management: adequate  Airway patency: patent  Anesthetic complications: no  Cardiovascular status: acceptable  Respiratory status: acceptable  Hydration status: acceptable  Post anesthesia nausea and vomiting:  none  Final Post Anesthesia Temperature Assessment:  Normothermia (36.0-37.5 degrees C)      INITIAL Post-op Vital signs:   Vitals Value Taken Time   /69 6/9/2020 10:13 AM   Temp 36.8 °C (98.3 °F) 6/9/2020  9:58 AM   Pulse 79 6/9/2020 10:13 AM   Resp 16 6/9/2020 10:13 AM   SpO2 96 % 6/9/2020 10:13 AM

## 2020-06-09 NOTE — BRIEF OP NOTE
Brief Postoperative Note    Patient: Harley Samuels  YOB: 1975  MRN: [id number]    Date of Procedure: 6/9/2020     Pre-Op Diagnosis: Pelvic pain in female [R10.2]    Post-Op Diagnosis: Same as preoperative diagnosis. Procedure(s): HYSTERECTOMY TOTAL LAPAROSCOPIC WITH BILATERAL SALPINGECTOM/ LEFT OOPHORECTOMY-REMOVAL OF IUD    Surgeon(s):  MD Bobbi Nichols DO    Surgical Assistant: None    Anesthesia: General     Estimated Blood Loss (mL): less than 477     Complications: Other: severe pelvic adhesions    Specimens:   ID Type Source Tests Collected by Time Destination   1 : Uterus, Bilateral Fallopian Tubes, Left Ovary Fresh   Genet Teresa MD 6/9/2020 0909 Pathology        Implants: * No implants in log *    Drains: * No LDAs found *    Findings: enlarged uterus, sigmoid colon adhered to left uterus/left ovary.  Normal right ovary    Electronically Signed by Madiha Peterson MD on 6/9/2020 at 9:40 AM

## 2020-06-09 NOTE — ANESTHESIA PREPROCEDURE EVALUATION
Relevant Problems   No relevant active problems       Anesthetic History   No history of anesthetic complications            Review of Systems / Medical History  Patient summary reviewed and pertinent labs reviewed    Pulmonary            Asthma (PRN inhaler) : well controlled       Neuro/Psych   Within defined limits           Cardiovascular              Hyperlipidemia    Exercise tolerance: >4 METS  Comments: Denies recent CP, SOB or Palpitations   GI/Hepatic/Renal  Within defined limits              Endo/Other        Morbid obesity     Other Findings              Physical Exam    Airway  Mallampati: II  TM Distance: 4 - 6 cm  Neck ROM: normal range of motion   Mouth opening: Normal     Cardiovascular  Regular rate and rhythm,  S1 and S2 normal,  no murmur, click, rub, or gallop  Rhythm: regular           Dental  No notable dental hx       Pulmonary  Breath sounds clear to auscultation               Abdominal  GI exam deferred       Other Findings            Anesthetic Plan    ASA: 3  Anesthesia type: general          Induction: Intravenous  Anesthetic plan and risks discussed with: Patient

## 2020-06-09 NOTE — PERIOP NOTES
TRANSFER - OUT REPORT:    Verbal report given to Marion(name) on Christos Shell  being transferred to [facility](unit) for routine post - op       Report consisted of patients Situation, Background, Assessment and   Recommendations(SBAR). Information from the following report(s) SBAR, OR Summary and MAR was reviewed with the receiving nurse. Lines:   Peripheral IV 06/09/20 Left Hand (Active)        Opportunity for questions and clarification was provided.       Patient transported with:   O2 @ 2 liters

## 2020-06-09 NOTE — PROGRESS NOTES
SBAR IN Report: Mother    Verbal report received from Manisha Casillas RN (full name & credentials) on this patient, who is now being transferred from PACU (unit) for routine progression of care. The patient is not wearing a green \"Anesthesia-Duramorph\" band. Report consisted of patient's Situation, Background, Assessment and Recommendations (SBAR).  ID bands were compared with the identification form, and verified with the patient and transferring nurse. Information from the SBAR and the [facility] Report was reviewed with the transferring nurse; opportunity for questions and clarification provided.

## 2020-06-09 NOTE — PERIOP NOTES
Patient drank Pre-Surgical drink as instructed:   2 Pre Surgical drinks before midnight and 1 pre surgical drink on way to hospital this am.     Warmer placed on patient's bed. Warm IV fluids infusing in pre-op per ERAS protocol.

## 2020-06-10 VITALS
TEMPERATURE: 98.2 F | RESPIRATION RATE: 18 BRPM | WEIGHT: 226 LBS | HEART RATE: 76 BPM | BODY MASS INDEX: 40.04 KG/M2 | HEIGHT: 63 IN | SYSTOLIC BLOOD PRESSURE: 108 MMHG | OXYGEN SATURATION: 96 % | DIASTOLIC BLOOD PRESSURE: 59 MMHG

## 2020-06-10 LAB
HCT VFR BLD AUTO: 34.1 % (ref 35.8–46.3)
HGB BLD-MCNC: 10.7 G/DL (ref 11.7–15.4)

## 2020-06-10 PROCEDURE — 36415 COLL VENOUS BLD VENIPUNCTURE: CPT

## 2020-06-10 PROCEDURE — 74011250637 HC RX REV CODE- 250/637: Performed by: OBSTETRICS & GYNECOLOGY

## 2020-06-10 PROCEDURE — 85018 HEMOGLOBIN: CPT

## 2020-06-10 RX ORDER — OXYCODONE HYDROCHLORIDE 10 MG/1
10 TABLET ORAL
Qty: 20 TAB | Refills: 0 | Status: SHIPPED | OUTPATIENT
Start: 2020-06-10 | End: 2020-06-13

## 2020-06-10 RX ADMIN — ACETAMINOPHEN 650 MG: 325 TABLET, FILM COATED ORAL at 09:02

## 2020-06-10 RX ADMIN — OXYCODONE 5 MG: 5 TABLET ORAL at 03:11

## 2020-06-10 RX ADMIN — OXYCODONE 5 MG: 5 TABLET ORAL at 11:13

## 2020-06-10 RX ADMIN — DOCUSATE SODIUM 100 MG: 100 CAPSULE, LIQUID FILLED ORAL at 09:02

## 2020-06-10 NOTE — DISCHARGE INSTRUCTIONS
Patient Education        Laparoscopic Hysterectomy: What to Expect at Home  Your Recovery     A hysterectomy is surgery to take out the uterus. In some cases, the ovaries and fallopian tubes also are taken out at the same time. You can expect to feel better and stronger each day, but you may need pain medicine for a week or two. You may get tired easily or have less energy than usual. The tiredness may last for several weeks after surgery. You will probably notice that your belly is swollen and puffy. This is common. The swelling will take several weeks to go down. You may take about 4 to 6 weeks to fully recover. It is important to avoid lifting while you are recovering so that you can heal.  This care sheet gives you a general idea about how long it will take for you to recover. But each person recovers at a different pace. Follow the steps below to get better as quickly as possible. How can you care for yourself at home? Activity  · Rest when you feel tired. · Be active. Walking is a good choice. · Allow the area to heal. Don't move quickly or lift anything heavy until you are feeling better. · You may shower 24 to 48 hours after surgery, if your doctor okays it. Pat the incision dry. Do not take a bath for the first 2 weeks, or until your doctor tells you it is okay. · Ask your doctor when it is okay for you to have sex. Diet  · You can eat your normal diet. If your stomach is upset, try bland, low-fat foods like plain rice, broiled chicken, toast, and yogurt. · If your bowel movements are not regular right after surgery, try to avoid constipation and straining. Drink plenty of water. Your doctor may suggest fiber, a stool softener, or a mild laxative. Medicines  · Your doctor will tell you if and when you can restart your medicines. He or she will also give you instructions about taking any new medicines.   · If you take aspirin or some other blood thinner, ask your doctor if and when to start taking it again. Make sure that you understand exactly what your doctor wants you to do. · Be safe with medicines. Read and follow all instructions on the label. ? If the doctor gave you a prescription medicine for pain, take it as prescribed. ? If you are not taking a prescription pain medicine, ask your doctor if you can take an over-the-counter medicine. · If your doctor prescribed antibiotics, take them as directed. Do not stop taking them just because you feel better. You need to take the full course of antibiotics. Incision care  · You may have stitches over the cuts (incisions) the doctor made in your belly. · If you have strips of tape on the cut (incision) the doctor made, leave the tape on for a week or until it falls off. · Wash the area daily with warm, soapy water, and pat it dry. Don't use hydrogen peroxide or alcohol. They can slow healing. · You may cover the area with a gauze bandage if it oozes fluid or rubs against clothing. · Change the bandage every day. Other instructions  · You may have some light vaginal bleeding. Wear sanitary pads if needed. Do not douche or use tampons. · Don't have sex until the doctor says it is okay. Follow-up care is a key part of your treatment and safety. Be sure to make and go to all appointments, and call your doctor if you are having problems. It's also a good idea to know your test results and keep a list of the medicines you take. When should you call for help? [id number] anytime you think you may need emergency care. For example, call if:  · You passed out (lost consciousness). · You have chest pain, are short of breath, or cough up blood. Call your doctor now or seek immediate medical care if:  · You have pain that does not get better after you take pain medicine. · You cannot pass stools or gas. · You have vaginal discharge that has increased in amount or smells bad. · You are sick to your stomach or cannot drink fluids.   · You have loose stitches, or your incision comes open. · Bright red blood has soaked through the bandage over your incision. · You have signs of infection, such as:  ? Increased pain, swelling, warmth, or redness. ? Red streaks leading from the incision. ? Pus draining from the incision. ? A fever. · You have bright red vaginal bleeding that soaks one or more pads in an hour, or you have large clots. · You have signs of a blood clot in your leg (called a deep vein thrombosis), such as:  ? Pain in your calf, back of the knee, thigh, or groin. ? Redness and swelling in your leg or groin. Watch closely for changes in your health, and be sure to contact your doctor if you have any problems. Where can you learn more? Go to http://yan-rita.info/  Enter Q131 in the search box to learn more about \"Laparoscopic Hysterectomy: What to Expect at Home. \"  Current as of: November 8, 2019               Content Version: 12.5  © 0714-0840 [employer]. Care instructions adapted under license by [employer] (which disclaims liability or warranty for this information). If you have questions about a medical condition or this instruction, always ask your healthcare professional. [place] [address] any warranty or liability for your use of this information.

## 2020-06-10 NOTE — OP NOTES
[place]  OPERATIVE REPORT    Name:  Francheska Jones  MR#:  [id number]  :  1975  ACCOUNT #:  [de-identified]  DATE OF SERVICE:  2020      PREOPERATIVE DIAGNOSIS:  Pelvic pain. POSTOPERATIVE DIAGNOSES:  Pelvic pain with severe pelvic adhesions and enlarged uterus. PROCEDURE PERFORMED:  Total laparoscopic hysterectomy with bilateral salpingectomy and left oophorectomy, removal of IUD, lysis of severe pelvic adhesions. SURGEON:  Bryson Blake MD    ASSISTANT:  Walter Reina DO    ANESTHESIA:  General.    COMPLICATIONS:  Severe pelvic adhesions. SPECIMENS REMOVED:  none. IMPLANTS:  None. ESTIMATED BLOOD LOSS:  100 mL    PROCEDURE:  After informed consent, the patient was taken to the operating room and given general anesthesia. She was prepped and draped in the usual sterile fashion. She was placed in the dorsal lithotomy position. A weighted speculum was placed in the vagina. Tenaculum was used to grasp the anterior lip of the cervix. The IUD was removed, this was a ParaGard, and this was discarded. The  device (smaller model) was then placed on the cervix, 2-0 Vicryl stitches were placed at the 3 and 9 o'clock positions to anchor this down. The cervix had been dilated to admit the cannula portion of this and the balloon was inflated. The tenaculum and then the weighted speculum were removed. At this time, the laparoscopy was begun by infiltrating dilute Marcaine into the infraumbilical area. A #11 blade was used to make a 5 mm incision. A Veress needle was passed through this and 3.5 liters of CO2 was infiltrated. The Veress needle was withdrawn and the 5 mm disposable trocar was placed through this incision, the obturator was removed, and the laparoscope was placed into the sleeve.   The patient was placed in Trendelenburg, uterus was elevated, and it was deemed possible to continue with the surgery; however, we immediately noted that the sigmoid colon was adhered to the left fundus of the uterus. A 7 mm port was placed in the right lower quadrant under direct visualization to allow the AirSeal device. A 10 mm port was placed in the left lower quadrant. Through these two lateral ports, the Ace harmonic and grasping devices were passed back and forth as well as suction devices. The blunt dissection was used to peel away the sigmoid colon. Very little sharp dissection was done. The left tube and ovary were identified. The infundibulopelvic ligament was then cauterized and cut.   The round ligament was cauterized and cut as well as the uterine vessels and the      Dictation Ends Here  See additional full dictation      Patti Fernandez MD      [id number]  D:  06/09/2020 23:59  T:  06/10/2020 10:27  JOB #:  [id number]

## 2020-06-10 NOTE — DISCHARGE SUMMARY
[facility] Discharge Summary     Patient ID:  Va Cohen  [id number]  56 y.o.  1975    Admit date: 2020    Discharge date and time: No discharge date for patient encounter. Admitting Physician: Lambert Dailey MD     Discharge Physician: Jorge Kaur M.D. Admission Diagnoses: Pelvic pain in female [R10.2]; Pelvic pain [R10.2]    Problem List: Hospital - Principal Problem:    S/P laparoscopic hysterectomy (2020)    Active Problems:    Pelvic pain (2020)     ; Other -   Patient Active Problem List   Diagnosis Code    AMA (advanced maternal age) multigravida 33+ O12.46   [facility] H/O  section Z98.891     delivery delivered O80    Encounter for insertion of ParaGard IUD Z30.430    Severe obesity (BMI 35.0-39. 9) E66.01    Dense breast tissue on mammogram R92.2    Endometriosis N80.9    Pelvic pain R10.2    S/P laparoscopic hysterectomy Z90.710        Discharge Diagnoses: Pelvic pain in female [R10.2]; Pelvic pain [R10.2]    Hospital Course: Va Cohen had unremarkeable progressive recovery. and Eating, ambulating, and voiding in a routine manner. Significant Diagnostic Studies:   Recent Results (from the past 24 hour(s))   HGB & HCT    Collection Time: 06/10/20  4:47 AM   Result Value Ref Range    HGB 10.7 (L) 11.7 - 15.4 g/dL    HCT 34.1 (L) 35.8 - 46.3 %       Procedures:  Total Laparoscopic Hysterectomy with Left Salpingo-Oophorectomy and right salpingectomy, cystoscopy    Discharge Exam:  Visit Vitals  /59 (BP 1 Location: Right arm, BP Patient Position: At rest)   Pulse 80   Temp 98.4 °F (36.9 °C)   Resp 18   Ht 5' 3\" (1.6 m)   Wt 226 lb (102.5 kg)   LMP 2020   SpO2 96%   BMI 40.03 kg/m²      Heart: regular rate and rhythm, S1, S2 normal, no murmur, click, rub or gallop  Lungs: clear to auscultation bilaterally  Extremities: normal, atraumatic, no cyanosis or edema, no DVT  Incision: no significant drainage, no dehiscence, no significant erythema    Patient Instructions:   Current Discharge Medication List      START taking these medications    Details   oxyCODONE IR (ROXICODONE) 10 mg tab immediate release tablet Take 1 Tab by mouth every six (6) hours as needed for Pain for up to 3 days. Max Daily Amount: 40 mg. Indications: pain  Qty: 20 Tab, Refills: 0    Associated Diagnoses: Endometriosis         CONTINUE these medications which have NOT CHANGED    Details   montelukast (Singulair) 10 mg tablet Take 10 mg by mouth nightly. fenofibrate nanocrystallized (TRICOR) 48 mg tablet Take 145 mg by mouth nightly. Azelastine (ASTEPRO) 0.15 % (205.5 mcg) nasal spray 2 Sprays by Nasal route.      ergocalciferol (ERGOCALCIFEROL) 50,000 unit capsule Take 50,000 Units by mouth. Indications: Twice weekly      fexofenadine (ALLEGRA ALLERGY) 180 mg tablet Take 180 mg by mouth nightly. fluticasone (FLONASE) 50 mcg/actuation nasal spray 2 Sprays by Both Nostrils route nightly. albuterol (PROVENTIL, VENTOLIN) 90 mcg/Actuation inhaler Take 2 Puffs by inhalation every six (6) hours as needed. Patient instructed to bring hospital DOS       acetaminophen (TYLENOL) 325 mg tablet Take  by mouth every four (4) hours as needed for Pain. copper (PARAGARD T 380A) 380 square mm IUD by IntraUTERine route. Indications: Placed 03/17/17            Activity: physical activity is restricted per discharge instructions  Diet: resume normal diet  Wound Care: Keep wound clean and dry, Reinforce dressing PRN and As directed    Follow-up with Claudine Miller MD in 2 weeks.     Signed:  Claudine Miller MD  6/10/2020  10:49 AM

## 2020-06-20 NOTE — OP NOTES
[address] [address]  OPERATIVE REPORT    Name:  Tay Johansen  MR#:  [id number]  :  1975  ACCOUNT #:  [de-identified]  DATE OF SERVICE:  2020    PREOPERATIVE DIAGNOSIS:  Pelvic pain. POSTOPERATIVE DIAGNOSES:  Pelvic pain with severe pelvic adhesions and enlarged uterus. PROCEDURE PERFORMED:  Total laparoscopic hysterectomy with bilateral salpingectomy and left oophorectomy, removal of IUD, lysis of severe pelvic adhesions. SURGEON:  Brandt Britt MD    ASSISTANT:  Lorne Flowers DO    ANESTHESIA:  General.    COMPLICATIONS:  Severe pelvic pain. SPECIMENS REMOVED:  Uterus/cervix/both tubes/left ovary. IMPLANTS:  None. ESTIMATED BLOOD LOSS:  100 mL. PROCEDURE:  After informed consent, the patient was taken to the operating room and given general anesthesia. She was prepped and draped in the usual sterile fashion. She was placed in the dorsal lithotomy position. A weighted speculum was placed in the vagina. Tenaculum was used to grasp the anterior lip of the cervix. The IUD was removed, this was a ParaGard and this was discarded. The avincula  device (smaller model) was then placed on the cervix, 2-0 Vicryl stitches were placed at the 3 o'clock and 9 o'clock positions to anchor this down. The cervix had been dilated to admit the cannula portion of this and the balloon was inflated. The tenaculum and the weighted speculum were removed. At this time, the laparoscopy was begun by infiltrating dilute Marcaine into the infraumbilical area. A #11 blade was used to make a 5 mm incision. A Veress needle was passed through this and the 3.5 liters of CO2 was infiltrated. The Veress needle was withdrawn and the 5 mm disposable trocar was placed through the incision, the obturator was removed, and the laparoscope was placed into the sleeve.   The patient was placed in Trendelenburg, uterus was elevated, and it was deemed possible to continue with the surgery; however, we immediately noted that the sigmoid colon was adhered to the left fundus of the uterus. A 7 mm port was placed in the right lower quadrant under direct visualization to allow the AirSeal device. A 10 mm port was placed in the left lower quadrant. Through these two lateral ports, the Ace harmonic and grasping devices were passed back and forth as well as the suction devices. The blunt dissection was used to peel away the sigmoid colon. A very little sharp dissection was done. The left tube and ovary were identified. The infundibulopelvic ligament was then cauterized and cut. The round ligament was cauterized and cut as well as the uterine vessels and the plane was carried down through the broad ligament and peritoneum was taken off the lower uterine segment. The uterine vessels were then grasped and coagulated and cut. On the right side, the ovary was found to be normal, so this was retained. The fallopian tube was taken away from this ovary, and the mesosalpinx was cauterized and cut and the tube was cut at the proximal portion and removed. This incision line was then taken down cauterizing and cutting the utero-ovarian ligament as well as the broad ligament and round ligament. Uterine vessels were then isolated as the peritoneum was taken down and these were cauterized and cut. The bladder was then taken off of the cervix, and the Advincula device was then noted, we could feel this all the way around the vagina and so the Ace Harmonic was used to cut the vaginal tissue surrounding its apposition to the cervix. Once this was done, the uterus and left tube and ovary were taken down through the vaginal opening. This device was removed also. An Asepto bulb was placed in the vagina to maintain pneumoperitoneum. Irrigation was carried out. The vaginal cuff was then closed with a Mayorga angle stitch in the corner with extracorporal knots of 2-0 Vicryl.   The vaginal cuff was then closed with anterior posterior fashion with approximately 4-5 sutures of interrupted 2-0 Vicryl extracorporal sutures. Inspection revealed no inadvertent damage, no excessive bleeding. At this time, a cystoscopy was performed by placing the irrigation device in the bladder and about 250 mL were used to fill the bladder. The laparoscope was placed through the urethra and both ureters were seemed to be effluxing well, there was no damage to the bladder, no stitches were noted. At this time, the bladder was then deflated with Ramsey. The abdomen was reinflated for final inspection and then deflated to make sure there was no bleeding from any of the suture lines or cautery lines. After final inspection, irrigation was carried out, the CO2 was allowed to be expelled as the ports were removed under direct  visualization. All three ports were closed at the skin level with subcuticular 4-0 Vicryl. These were reinforced with Dermabond. The Asepto bulb had been removed prior to the cystoscopy, so the vagina was clean at this time. The counts were correct x2, the patient was awakened and taken to recovery room in stable condition. Dr Araceli Chew assistance was required due to the complexity of case. Assistant performed R side of the hysterectomy and positioned the uterine manipulator and provided counter traction for dissection throughout the procedure.     Magda Garza MD      [id number]  D:  06/20/2020 16:18  T:  06/20/2020 18:10  JOB #:  [id number]

## 2021-03-04 ENCOUNTER — TRANSCRIBE ORDER (OUTPATIENT)
Dept: SCHEDULING | Age: 46
End: 2021-03-04

## 2021-03-04 DIAGNOSIS — Z12.31 VISIT FOR SCREENING MAMMOGRAM: Primary | ICD-10-CM

## 2021-03-10 ENCOUNTER — HOSPITAL ENCOUNTER (OUTPATIENT)
Dept: MAMMOGRAPHY | Age: 46
Discharge: HOME | End: 2021-03-10
Attending: OBSTETRICS & GYNECOLOGY
Payer: COMMERCIAL

## 2021-03-10 DIAGNOSIS — Z12.31 VISIT FOR SCREENING MAMMOGRAM: ICD-10-CM

## 2021-03-10 PROCEDURE — 77063 BREAST TOMOSYNTHESIS BI: CPT

## 2022-03-18 PROBLEM — N80.9 ENDOMETRIOSIS: Status: ACTIVE | Noted: 2019-11-26

## 2022-03-18 PROBLEM — R10.2 PELVIC PAIN: Status: ACTIVE | Noted: 2020-06-09

## 2022-03-19 PROBLEM — R92.30 DENSE BREAST TISSUE ON MAMMOGRAM: Status: ACTIVE | Noted: 2019-07-22

## 2022-03-19 PROBLEM — R92.2 DENSE BREAST TISSUE ON MAMMOGRAM: Status: ACTIVE | Noted: 2019-07-22

## 2022-03-19 PROBLEM — Z30.430 ENCOUNTER FOR INSERTION OF PARAGARD IUD: Status: ACTIVE | Noted: 2017-03-17

## 2022-03-20 PROBLEM — Z90.710 S/P LAPAROSCOPIC HYSTERECTOMY: Status: ACTIVE | Noted: 2020-06-09

## 2022-05-12 ENCOUNTER — TRANSCRIBE ORDER (OUTPATIENT)
Dept: SCHEDULING | Age: 47
End: 2022-05-12

## 2022-05-12 DIAGNOSIS — Z12.31 ENCOUNTER FOR SCREENING MAMMOGRAM FOR MALIGNANT NEOPLASM OF BREAST: Primary | ICD-10-CM

## 2022-05-26 ENCOUNTER — HOSPITAL ENCOUNTER (OUTPATIENT)
Dept: MAMMOGRAPHY | Age: 47
Discharge: HOME | End: 2022-05-29
Payer: COMMERCIAL

## 2022-05-26 DIAGNOSIS — Z12.31 ENCOUNTER FOR SCREENING MAMMOGRAM FOR MALIGNANT NEOPLASM OF BREAST: ICD-10-CM

## 2022-05-26 PROCEDURE — 77063 BREAST TOMOSYNTHESIS BI: CPT

## 2022-10-03 ENCOUNTER — OFFICE VISIT (OUTPATIENT)
Dept: OBGYN CLINIC | Age: 47
End: 2022-10-03
Payer: COMMERCIAL

## 2022-10-03 VITALS
WEIGHT: 170 LBS | DIASTOLIC BLOOD PRESSURE: 80 MMHG | SYSTOLIC BLOOD PRESSURE: 130 MMHG | HEIGHT: 63 IN | BODY MASS INDEX: 30.12 KG/M2

## 2022-10-03 DIAGNOSIS — N64.4 NIPPLE PAIN: Primary | ICD-10-CM

## 2022-10-03 DIAGNOSIS — N64.52 NIPPLE DISCHARGE: ICD-10-CM

## 2022-10-03 PROCEDURE — 99213 OFFICE O/P EST LOW 20 MIN: CPT | Performed by: OBSTETRICS & GYNECOLOGY

## 2022-10-03 RX ORDER — CETIRIZINE HYDROCHLORIDE 10 MG/1
10 TABLET ORAL DAILY
COMMUNITY

## 2023-05-26 ENCOUNTER — TELEPHONE (OUTPATIENT)
Dept: OBGYN CLINIC | Age: 48
End: 2023-05-26

## 2023-05-26 DIAGNOSIS — N64.4 BREAST PAIN: Primary | ICD-10-CM

## 2023-05-26 NOTE — TELEPHONE ENCOUNTER
GYN patient called stating that she was scheduling her mammogram and told them she has occasional breast pain. Debbie Mathew recommended patient call our office to discuss if dx mammogram was needed. Per Dr. Navi metz to order Dx Javon Micheal mammogram with bilateral ultrasound for breast pain. Orders placed. Pt notified.       Orders Placed This Encounter    [facility]AL DIGITAL DIAGNOSTIC BILATERAL     Standing Status:   Future     Standing Expiration Date:   7/26/2024    US BREASTS COMPLETE     Standing Status:   Future     Standing Expiration Date:   5/26/2024

## 2025-06-09 ENCOUNTER — TRANSCRIBE ORDERS (OUTPATIENT)
Dept: SCHEDULING | Age: 50
End: 2025-06-09

## 2025-06-09 DIAGNOSIS — Z12.31 VISIT FOR SCREENING MAMMOGRAM: Primary | ICD-10-CM

## 2025-06-28 ENCOUNTER — HOSPITAL ENCOUNTER (OUTPATIENT)
Dept: MAMMOGRAPHY | Age: 50
Discharge: HOME | End: 2025-07-01
Payer: COMMERCIAL

## 2025-06-28 DIAGNOSIS — Z12.31 VISIT FOR SCREENING MAMMOGRAM: ICD-10-CM

## 2025-06-28 PROCEDURE — 77063 BREAST TOMOSYNTHESIS BI: CPT

## (undated) DEVICE — KIT,ANTI FOG,W/SPONGE & FLUID,SOFT PACK: Brand: MEDLINE

## (undated) DEVICE — LOGICUT SCISSOR LENGTH 320MM: Brand: LOGI - LAPAROSCOPIC INSTRUMENT SYSTEM

## (undated) DEVICE — DELINEATOR MANIPULATOR 3.0CM -- ADVINCULA

## (undated) DEVICE — SYRINGE IRRIG 60ML SFT PLIABLE BLB EZ TO GRP 1 HND USE W/

## (undated) DEVICE — 2, DISPOSABLE SUCTION/IRRIGATOR WITHOUT DISPOSABLE TIP: Brand: STRYKEFLOW

## (undated) DEVICE — DRAPE,UNDERBUTTOCKS,PCH,STERILE: Brand: MEDLINE

## (undated) DEVICE — TOTAL 1-LAYER TRAY, LATEX FOLEY, 16FR 10: Brand: MEDLINE

## (undated) DEVICE — SYR 50ML LR LCK 1ML GRAD NSAF --

## (undated) DEVICE — GARMENT,MEDLINE,DVT,INT,CALF,LG, GEN2: Brand: MEDLINE

## (undated) DEVICE — SYR 10ML LUER LOK 1/5ML GRAD --

## (undated) DEVICE — CATH FOL TY IC BAG 16FR 2000ML -- CONVERT TO ITEM 363158

## (undated) DEVICE — Device: Brand: PORTEX

## (undated) DEVICE — PENCIL ES L3M BTTN SWCH S STL HEX LOK BLDE ELECTRD HOLSTER

## (undated) DEVICE — TROCAR: Brand: KII FIOS FIRST ENTRY

## (undated) DEVICE — BLUNT DISSECTOR: Brand: ENDO PEANUT

## (undated) DEVICE — DERMABOND SKIN ADH 0.7ML -- DERMABOND ADVANCED 12/BX

## (undated) DEVICE — SUTURE PDS II SZ 0 L36IN ABSRB VIOLETCTB-1 L36MM 1/2 CIR ZB346

## (undated) DEVICE — TUBING, SUCTION, 1/4" X 10', STRAIGHT: Brand: MEDLINE

## (undated) DEVICE — (D)PREP SKN CHLRAPRP APPL 26ML -- CONVERT TO ITEM 371833

## (undated) DEVICE — LAPAROSCOPIC TROCAR SLEEVE/SINGLE USE: Brand: KII® OPTICAL ACCESS SYSTEM

## (undated) DEVICE — KENDALL SCD EXPRESS SLEEVES, KNEE LENGTH, MEDIUM: Brand: KENDALL SCD

## (undated) DEVICE — TROCAR RMFG Z 3RD SLEEVE 5X100 -- LAWSON OEM ITEM 262365 PK/6

## (undated) DEVICE — MEDI-VAC NON-CONDUCTIVE SUCTION TUBING: Brand: CARDINAL HEALTH

## (undated) DEVICE — LAPAROSCOPY/PELVISCOPY PACK: Brand: CONVERTORS

## (undated) DEVICE — 40585 XL ADVANCED TRENDELENBURG POSITIONING KIT: Brand: 40585 XL ADVANCED TRENDELENBURG POSITIONING KIT

## (undated) DEVICE — SOLUTION IRRIG 3000ML 0.9% SOD CHL FLX CONT 0797208] ICU MEDICAL INC]

## (undated) DEVICE — SOLUTION IV 1000ML 0.9% SOD CHL

## (undated) DEVICE — CARDINAL HEALTH FLEXIBLE LIGHT HANDLE COVER: Brand: CARDINAL HEALTH

## (undated) DEVICE — VCARE LARGE, UTERINE MANIPULATOR, VAGINAL-CERVICAL-AHLUWALIA'S-RETRACTOR-ELEVATOR: Brand: VCARE

## (undated) DEVICE — REM POLYHESIVE ADULT PATIENT RETURN ELECTRODE: Brand: VALLEYLAB

## (undated) DEVICE — SUTURE VCRL SZ 2-0 L36IN ABSRB UD CTB-1 L36MM 1/2 CIR BLNT JB945H

## (undated) DEVICE — TRAY PREP DRY W/ PREM GLV 2 APPL 6 SPNG 2 UNDPD 1 OVERWRAP

## (undated) DEVICE — SHEARS ENDOSCP L36CM DIA5MM ULTRASONIC CRV TIP HARM

## (undated) DEVICE — SOLUTION IRRIG 1000ML H2O STRL BLT

## (undated) DEVICE — AIRSEAL 5 MM ACCESS PORT AND LOW PROFILE OBTURATOR WITH BLADELESS OPTICAL TIP, 120 MM LENGTH: Brand: AIRSEAL

## (undated) DEVICE — GYN LAPAROSCOPY: Brand: MEDLINE INDUSTRIES, INC.

## (undated) DEVICE — SUTURE MCRYL SZ 4-0 L27IN ABSRB UD L19MM PS-2 1/2 CIR PRIM Y426H

## (undated) DEVICE — JELLY LUBRICATING 10GM PREFIL SYR LUBE

## (undated) DEVICE — DRAPE TWL SURG 16X26IN BLU ORB04] ALLCARE INC]

## (undated) DEVICE — VCARE MEDIUM, UTERINE MANIPULATOR, VAGINAL-CERVICAL-AHLUWALIA'S-RETRACTOR-ELEVATOR: Brand: VCARE

## (undated) DEVICE — GOWN,SIRUS,POLYRNF,BRTHSLV,XL,30/CS: Brand: MEDLINE

## (undated) DEVICE — SUTURE PLN GUT SZ 2-0 L27IN ABSRB YELLOWISH TAN L40MM CT 853H

## (undated) DEVICE — PAD,NON-ADHERENT,3X8,STERILE,LF,1/PK: Brand: MEDLINE

## (undated) DEVICE — SUTURE VCRL SZ 1 L36IN ABSRB UD L36MM CTB-1 1/2 CIR BLNT JB947

## (undated) DEVICE — SUTURE VCRL 0 L36IN ABSRB CTB-1 BRAID UD JB946

## (undated) DEVICE — [HIGH FLOW INSUFFLATOR,  DO NOT USE IF PACKAGE IS DAMAGED,  KEEP DRY,  KEEP AWAY FROM SUNLIGHT,  PROTECT FROM HEAT AND RADIOACTIVE SOURCES.]: Brand: PNEUMOSURE

## (undated) DEVICE — SUT CHRMC 0 27IN CT1 BRN --

## (undated) DEVICE — SUTURE VCRL SZ 0 L36IN ABSRB UD L48MM CTX 1/2 CIR J978H

## (undated) DEVICE — 2000CC GUARDIAN II: Brand: GUARDIAN

## (undated) DEVICE — CYSTO/BLADDER IRRIGATION SET, REGULATING CLAMP

## (undated) DEVICE — SURGICAL PROCEDURE PACK C SECT CDS

## (undated) DEVICE — AMD ANTIMICROBIAL GAUZE SPONGES,12 PLY USP TYPE VII, 0.2% POLYHEXAMETHYLENE BIGUANIDE HCI (PHMB): Brand: CURITY

## (undated) DEVICE — SUTURE MCRYL SZ 3-0 L27IN ABSRB UD L60MM KS STR REV CUT Y523H

## (undated) DEVICE — SUTURE VCRL SZ 3-0 L36IN ABSRB UD L36MM CT-1 1/2 CIR J944H

## (undated) DEVICE — SUTURE MCRYL SZ 4-0 L18IN ABSRB UD L19MM PS-2 3/8 CIR PRIM Y496G

## (undated) DEVICE — CONTAINER SPEC HISTOLOGY 900ML POLYPR

## (undated) DEVICE — Z DUPLICATE USE 2847003 INJECTOR UTER

## (undated) DEVICE — SUTURE VCRL SZ 0 L36IN ABSRB UD L36MM CT-1 1/2 CIR J946H

## (undated) DEVICE — INSUFFLATION NEEDLE TO ESTABLISH PNEUMOPERITONEUM.: Brand: INSUFFLATION NEEDLE

## (undated) DEVICE — SUTURE COAT VCRL SZ 4-0 L18IN ABSRB UD L19MM PS-2 1/2 CIR J496G

## (undated) DEVICE — TRI-LUMEN FILTERED TUBE SET WITH ACTIVATED CHARCOAL FILTER: Brand: AIRSEAL

## (undated) DEVICE — AMD ANTIMICROBIAL NON-ADHERENT PAD,0.2% POLYHEXAMETHYLENE BIGUANIDE HCI (PHMB): Brand: TELFA

## (undated) DEVICE — CONTAINER SPEC FRMLN 120ML --

## (undated) DEVICE — TROCAR: Brand: KII® SLEEVE

## (undated) DEVICE — GOWN,REINF,POLY,ECL,PP SLV,XL: Brand: MEDLINE

## (undated) DEVICE — VCARE SMALL, UTERINE MANIPULATOR, VAGINAL-CERVICAL-AHLUWALIA'S-RETRACTOR-ELEVATOR: Brand: VCARE